# Patient Record
Sex: FEMALE | Race: WHITE | NOT HISPANIC OR LATINO | Employment: FULL TIME | URBAN - METROPOLITAN AREA
[De-identification: names, ages, dates, MRNs, and addresses within clinical notes are randomized per-mention and may not be internally consistent; named-entity substitution may affect disease eponyms.]

---

## 2017-02-14 ENCOUNTER — TELEPHONE (OUTPATIENT)
Dept: FAMILY MEDICINE | Age: 47
End: 2017-02-14

## 2017-02-14 RX ORDER — VALACYCLOVIR HYDROCHLORIDE 500 MG/1
500 TABLET, FILM COATED ORAL DAILY
Qty: 30 TABLET | Refills: 12 | Status: CANCELLED | OUTPATIENT
Start: 2017-02-14

## 2017-03-30 ENCOUNTER — WALK IN (OUTPATIENT)
Dept: URGENT CARE | Age: 47
End: 2017-03-30

## 2017-03-30 VITALS — HEART RATE: 67 BPM | TEMPERATURE: 98.5 F | OXYGEN SATURATION: 97 %

## 2017-03-30 DIAGNOSIS — J02.9 PHARYNGITIS, UNSPECIFIED ETIOLOGY: Primary | ICD-10-CM

## 2017-03-30 LAB — S PYO AG THROAT QL: NORMAL

## 2017-03-30 PROCEDURE — 99213 OFFICE O/P EST LOW 20 MIN: CPT | Performed by: NURSE PRACTITIONER

## 2017-03-30 PROCEDURE — 87880 STREP A ASSAY W/OPTIC: CPT | Performed by: NURSE PRACTITIONER

## 2017-04-04 ENCOUNTER — OFF PREMISE (OUTPATIENT)
Dept: OTHER | Age: 47
End: 2017-04-04

## 2017-08-09 ENCOUNTER — OFFICE VISIT (OUTPATIENT)
Dept: FAMILY MEDICINE | Age: 47
End: 2017-08-09

## 2017-08-09 VITALS
DIASTOLIC BLOOD PRESSURE: 64 MMHG | TEMPERATURE: 98 F | WEIGHT: 145 LBS | RESPIRATION RATE: 16 BRPM | SYSTOLIC BLOOD PRESSURE: 100 MMHG | HEART RATE: 72 BPM | BODY MASS INDEX: 24.16 KG/M2 | HEIGHT: 65 IN

## 2017-08-09 DIAGNOSIS — K21.9 GASTROESOPHAGEAL REFLUX DISEASE WITHOUT ESOPHAGITIS: ICD-10-CM

## 2017-08-09 DIAGNOSIS — F43.9 SITUATIONAL STRESS: ICD-10-CM

## 2017-08-09 DIAGNOSIS — A60.00 GENITAL HERPES SIMPLEX, UNSPECIFIED SITE: ICD-10-CM

## 2017-08-09 DIAGNOSIS — Z12.39 SCREENING FOR BREAST CANCER: ICD-10-CM

## 2017-08-09 DIAGNOSIS — Z00.00 WELL WOMAN EXAM WITHOUT GYNECOLOGICAL EXAM: Primary | ICD-10-CM

## 2017-08-09 PROCEDURE — 99396 PREV VISIT EST AGE 40-64: CPT | Performed by: NURSE PRACTITIONER

## 2017-08-09 ASSESSMENT — PATIENT HEALTH QUESTIONNAIRE - PHQ9
SUM OF ALL RESPONSES TO PHQ9 QUESTIONS 1 AND 2: 2
CLINICAL INTERPRETATION OF PHQ2 SCORE: 2

## 2017-08-17 ENCOUNTER — HOSPITAL ENCOUNTER (OUTPATIENT)
Dept: MAMMOGRAPHY | Age: 47
Discharge: HOME | End: 2017-08-17
Attending: NURSE PRACTITIONER

## 2017-08-17 DIAGNOSIS — Z12.39 SCREENING FOR BREAST CANCER: ICD-10-CM

## 2017-08-17 PROCEDURE — 77063 BREAST TOMOSYNTHESIS BI: CPT

## 2017-08-17 PROCEDURE — 77063 BREAST TOMOSYNTHESIS BI: CPT | Performed by: RADIOLOGY

## 2017-08-17 PROCEDURE — G0202 SCR MAMMO BI INCL CAD: HCPCS | Performed by: RADIOLOGY

## 2017-09-05 RX ORDER — VALACYCLOVIR HYDROCHLORIDE 500 MG/1
500 TABLET, FILM COATED ORAL DAILY
Qty: 30 TABLET | Refills: 11 | Status: SHIPPED | OUTPATIENT
Start: 2017-09-05 | End: 2018-09-01 | Stop reason: SDUPTHER

## 2017-10-30 ENCOUNTER — BEHAVIORAL HEALTH (OUTPATIENT)
Dept: BEHAVIORAL HEALTH | Age: 47
End: 2017-10-30

## 2017-10-30 DIAGNOSIS — F43.29 ADJUSTMENT DISORDER WITH MIXED EMOTIONAL FEATURES: Primary | ICD-10-CM

## 2017-10-30 PROCEDURE — 90791 PSYCH DIAGNOSTIC EVALUATION: CPT | Performed by: SOCIAL WORKER

## 2017-10-31 ASSESSMENT — PATIENT HEALTH QUESTIONNAIRE - PHQ9
SUM OF ALL RESPONSES TO PHQ QUESTIONS 1-9: 8
SUM OF ALL RESPONSES TO PHQ9 QUESTIONS 1 AND 2: 2
CLINICAL INTERPRETATION OF PHQ2 SCORE: 2
SUM OF ALL RESPONSES TO PHQ9 QUESTIONS 1 TO 9: 8
5. POOR APPETITE OR OVEREATING: MORE THAN HALF THE DAYS

## 2017-10-31 ASSESSMENT — ANXIETY QUESTIONNAIRES
3. WORRYING TOO MUCH ABOUT DIFFERENT THINGS: 1
2. NOT BEING ABLE TO STOP OR CONTROL WORRYING: 1
4. TROUBLE RELAXING: SEVERAL DAYS
7. FEELING AFRAID AS IF SOMETHING AWFUL MIGHT HAPPEN: SEVERAL DAYS
2. NOT BEING ABLE TO STOP OR CONTROL WORRYING: SEVERAL DAYS
7. FEELING AFRAID AS IF SOMETHING AWFUL MIGHT HAPPEN: 1
3. WORRYING TOO MUCH ABOUT DIFFERENT THINGS: SEVERAL DAYS
6. BECOMING EASILY ANNOYED OR IRRITABLE: SEVERAL DAYS
5. BEING SO RESTLESS THAT IT IS HARD TO SIT STILL: SEVERAL DAYS
4. TROUBLE RELAXING: 1
GAD7 TOTAL SCORE: 7
1. FEELING NERVOUS, ANXIOUS, OR ON EDGE: SEVERAL DAYS
5. BEING SO RESTLESS THAT IT IS HARD TO SIT STILL: 1
6. BECOMING EASILY ANNOYED OR IRRITABLE: 1
1. FEELING NERVOUS, ANXIOUS, OR ON EDGE: 1
IF YOU CHECKED OFF ANY PROBLEMS ON THIS QUESTIONNAIRE, HOW DIFFICULT HAVE THESE PROBLEMS MADE IT FOR YOU TO DO YOUR WORK, TAKE CARE OF THINGS AT HOME, OR GET ALONG WITH OTHER PEOPLE: VERY DIFFICULT

## 2017-11-13 ENCOUNTER — BEHAVIORAL HEALTH (OUTPATIENT)
Dept: BEHAVIORAL HEALTH | Age: 47
End: 2017-11-13

## 2017-11-13 DIAGNOSIS — F43.29 ADJUSTMENT DISORDER WITH MIXED EMOTIONAL FEATURES: Primary | ICD-10-CM

## 2017-11-13 PROCEDURE — 90834 PSYTX W PT 45 MINUTES: CPT | Performed by: SOCIAL WORKER

## 2018-01-01 ENCOUNTER — EXTERNAL RECORD (OUTPATIENT)
Dept: OTHER | Age: 48
End: 2018-01-01

## 2018-01-10 ENCOUNTER — BEHAVIORAL HEALTH (OUTPATIENT)
Dept: BEHAVIORAL HEALTH | Age: 48
End: 2018-01-10

## 2018-01-10 DIAGNOSIS — F43.29 ADJUSTMENT DISORDER WITH MIXED EMOTIONAL FEATURES: Primary | ICD-10-CM

## 2018-01-10 PROCEDURE — 90834 PSYTX W PT 45 MINUTES: CPT | Performed by: SOCIAL WORKER

## 2018-01-22 ENCOUNTER — BEHAVIORAL HEALTH (OUTPATIENT)
Dept: BEHAVIORAL HEALTH | Age: 48
End: 2018-01-22

## 2018-01-22 DIAGNOSIS — F43.29 ADJUSTMENT DISORDER WITH MIXED EMOTIONAL FEATURES: Primary | ICD-10-CM

## 2018-01-22 PROCEDURE — 90837 PSYTX W PT 60 MINUTES: CPT | Performed by: SOCIAL WORKER

## 2018-02-05 ENCOUNTER — BEHAVIORAL HEALTH (OUTPATIENT)
Dept: BEHAVIORAL HEALTH | Age: 48
End: 2018-02-05

## 2018-02-05 DIAGNOSIS — F43.29 ADJUSTMENT DISORDER WITH MIXED EMOTIONAL FEATURES: Primary | ICD-10-CM

## 2018-02-05 PROCEDURE — 90834 PSYTX W PT 45 MINUTES: CPT | Performed by: SOCIAL WORKER

## 2018-02-23 ENCOUNTER — WALK IN (OUTPATIENT)
Dept: URGENT CARE | Age: 48
End: 2018-02-23

## 2018-02-23 VITALS
OXYGEN SATURATION: 99 % | WEIGHT: 141.09 LBS | TEMPERATURE: 97.8 F | SYSTOLIC BLOOD PRESSURE: 110 MMHG | DIASTOLIC BLOOD PRESSURE: 70 MMHG | BODY MASS INDEX: 23.51 KG/M2 | HEIGHT: 65 IN | RESPIRATION RATE: 14 BRPM | HEART RATE: 69 BPM

## 2018-02-23 DIAGNOSIS — J06.9 VIRAL URI: ICD-10-CM

## 2018-02-23 DIAGNOSIS — R10.9 ABDOMINAL DISCOMFORT: Primary | ICD-10-CM

## 2018-02-23 PROCEDURE — 99214 OFFICE O/P EST MOD 30 MIN: CPT | Performed by: PHYSICIAN ASSISTANT

## 2018-02-26 ENCOUNTER — BEHAVIORAL HEALTH (OUTPATIENT)
Dept: BEHAVIORAL HEALTH | Age: 48
End: 2018-02-26

## 2018-02-26 DIAGNOSIS — F43.29 ADJUSTMENT DISORDER WITH MIXED EMOTIONAL FEATURES: Primary | ICD-10-CM

## 2018-02-26 PROCEDURE — 90834 PSYTX W PT 45 MINUTES: CPT | Performed by: SOCIAL WORKER

## 2018-05-17 ENCOUNTER — TELEPHONE (OUTPATIENT)
Dept: FAMILY MEDICINE | Age: 48
End: 2018-05-17

## 2018-05-17 RX ORDER — OMEPRAZOLE 20 MG/1
20 CAPSULE, DELAYED RELEASE ORAL DAILY
Qty: 90 CAPSULE | Refills: 0 | Status: SHIPPED | OUTPATIENT
Start: 2018-05-17 | End: 2018-05-21 | Stop reason: SDUPTHER

## 2018-05-21 RX ORDER — OMEPRAZOLE 20 MG/1
20 CAPSULE, DELAYED RELEASE ORAL DAILY
Qty: 90 CAPSULE | Refills: 1 | Status: SHIPPED | OUTPATIENT
Start: 2018-05-21 | End: 2019-01-28 | Stop reason: SDUPTHER

## 2018-07-25 ENCOUNTER — APPOINTMENT (OUTPATIENT)
Dept: MAMMOGRAPHY | Age: 48
End: 2018-07-25
Attending: NURSE PRACTITIONER

## 2018-08-20 ENCOUNTER — HOSPITAL ENCOUNTER (OUTPATIENT)
Dept: MAMMOGRAPHY | Age: 48
Discharge: HOME | End: 2018-08-20
Attending: NURSE PRACTITIONER

## 2018-08-20 DIAGNOSIS — Z12.39 SCREENING BREAST EXAMINATION: ICD-10-CM

## 2018-08-20 PROCEDURE — 77063 BREAST TOMOSYNTHESIS BI: CPT

## 2018-08-20 PROCEDURE — 77067 SCR MAMMO BI INCL CAD: CPT | Performed by: RADIOLOGY

## 2018-08-20 PROCEDURE — 77063 BREAST TOMOSYNTHESIS BI: CPT | Performed by: RADIOLOGY

## 2018-08-26 ENCOUNTER — WALK IN (OUTPATIENT)
Dept: URGENT CARE | Age: 48
End: 2018-08-26

## 2018-08-26 DIAGNOSIS — R11.2 NAUSEA AND VOMITING, INTRACTABILITY OF VOMITING NOT SPECIFIED, UNSPECIFIED VOMITING TYPE: Primary | ICD-10-CM

## 2018-08-26 PROCEDURE — 99214 OFFICE O/P EST MOD 30 MIN: CPT | Performed by: PHYSICIAN ASSISTANT

## 2018-08-26 RX ORDER — ONDANSETRON 4 MG/1
4 TABLET, ORALLY DISINTEGRATING ORAL EVERY 8 HOURS PRN
Qty: 6 TABLET | Refills: 0 | Status: SHIPPED | OUTPATIENT
Start: 2018-08-26 | End: 2018-12-26 | Stop reason: ALTCHOICE

## 2018-08-26 ASSESSMENT — PAIN SCALES - GENERAL: PAINLEVEL: 5-6

## 2018-09-04 RX ORDER — VALACYCLOVIR HYDROCHLORIDE 500 MG/1
500 TABLET, FILM COATED ORAL DAILY
Qty: 30 TABLET | Refills: 3 | Status: SHIPPED | OUTPATIENT
Start: 2018-09-04 | End: 2019-01-11 | Stop reason: SDUPTHER

## 2018-12-26 ENCOUNTER — OFFICE VISIT (OUTPATIENT)
Dept: FAMILY MEDICINE | Age: 48
End: 2018-12-26

## 2018-12-26 ENCOUNTER — LAB SERVICES (OUTPATIENT)
Dept: LAB | Age: 48
End: 2018-12-26

## 2018-12-26 VITALS
WEIGHT: 144 LBS | HEART RATE: 73 BPM | DIASTOLIC BLOOD PRESSURE: 77 MMHG | TEMPERATURE: 98.1 F | BODY MASS INDEX: 23.99 KG/M2 | SYSTOLIC BLOOD PRESSURE: 122 MMHG | RESPIRATION RATE: 16 BRPM | HEIGHT: 65 IN

## 2018-12-26 DIAGNOSIS — Z13.29 SCREENING FOR ENDOCRINE, NUTRITIONAL, METABOLIC AND IMMUNITY DISORDER: ICD-10-CM

## 2018-12-26 DIAGNOSIS — K44.9 HIATAL HERNIA: ICD-10-CM

## 2018-12-26 DIAGNOSIS — N92.1 MENOMETRORRHAGIA: ICD-10-CM

## 2018-12-26 DIAGNOSIS — Z13.228 SCREENING FOR ENDOCRINE, NUTRITIONAL, METABOLIC AND IMMUNITY DISORDER: ICD-10-CM

## 2018-12-26 DIAGNOSIS — Z01.419 WELL WOMAN EXAM WITH ROUTINE GYNECOLOGICAL EXAM: Primary | ICD-10-CM

## 2018-12-26 DIAGNOSIS — K21.9 GASTROESOPHAGEAL REFLUX DISEASE WITHOUT ESOPHAGITIS: ICD-10-CM

## 2018-12-26 DIAGNOSIS — Z13.21 SCREENING FOR ENDOCRINE, NUTRITIONAL, METABOLIC AND IMMUNITY DISORDER: ICD-10-CM

## 2018-12-26 DIAGNOSIS — Z13.0 SCREENING FOR ENDOCRINE, NUTRITIONAL, METABOLIC AND IMMUNITY DISORDER: ICD-10-CM

## 2018-12-26 DIAGNOSIS — Z23 NEED FOR VACCINATION: ICD-10-CM

## 2018-12-26 PROCEDURE — 90471 IMMUNIZATION ADMIN: CPT | Performed by: NURSE PRACTITIONER

## 2018-12-26 PROCEDURE — 99396 PREV VISIT EST AGE 40-64: CPT | Performed by: NURSE PRACTITIONER

## 2018-12-26 PROCEDURE — 36415 COLL VENOUS BLD VENIPUNCTURE: CPT | Performed by: FAMILY MEDICINE

## 2018-12-26 PROCEDURE — 90714 TD VACC NO PRESV 7 YRS+ IM: CPT | Performed by: NURSE PRACTITIONER

## 2018-12-26 PROCEDURE — 85025 COMPLETE CBC W/AUTO DIFF WBC: CPT | Performed by: INTERNAL MEDICINE

## 2018-12-26 PROCEDURE — 80048 BASIC METABOLIC PNL TOTAL CA: CPT | Performed by: INTERNAL MEDICINE

## 2018-12-26 ASSESSMENT — PATIENT HEALTH QUESTIONNAIRE - PHQ9
CLINICAL INTERPRETATION OF PHQ2 SCORE: 0
SUM OF ALL RESPONSES TO PHQ9 QUESTIONS 1 AND 2: 0

## 2018-12-27 ENCOUNTER — TELEPHONE (OUTPATIENT)
Dept: FAMILY MEDICINE | Age: 48
End: 2018-12-27

## 2018-12-27 LAB
ANION GAP SERPL CALC-SCNC: 9 MMOL/L (ref 10–20)
BASOPHILS # BLD AUTO: 0.1 K/MCL (ref 0–0.3)
BASOPHILS NFR BLD AUTO: 1 %
BUN SERPL-MCNC: 15 MG/DL (ref 6–20)
BUN/CREAT SERPL: 22 (ref 7–25)
CALCIUM SERPL-MCNC: 8.9 MG/DL (ref 8.4–10.2)
CHLORIDE SERPL-SCNC: 105 MMOL/L (ref 98–107)
CO2 SERPL-SCNC: 29 MMOL/L (ref 21–32)
CREAT SERPL-MCNC: 0.68 MG/DL (ref 0.51–0.95)
DIFFERENTIAL METHOD BLD: NORMAL
EOSINOPHIL # BLD AUTO: 0.1 K/MCL (ref 0.1–0.5)
EOSINOPHIL NFR SPEC: 2 %
ERYTHROCYTE [DISTWIDTH] IN BLOOD: 12.9 % (ref 11–15)
FASTING STATUS PATIENT QL REPORTED: ABNORMAL HRS
GLUCOSE SERPL-MCNC: 84 MG/DL (ref 65–99)
HCT VFR BLD CALC: 39.6 % (ref 36–46.5)
HGB BLD-MCNC: 12.7 G/DL (ref 12–15.5)
IMM GRANULOCYTES # BLD AUTO: 0 K/MCL (ref 0–0.2)
IMM GRANULOCYTES NFR BLD: 0 %
LYMPHOCYTES # BLD MANUAL: 1.8 K/MCL (ref 1–4.8)
LYMPHOCYTES NFR BLD MANUAL: 35 %
MCH RBC QN AUTO: 29.5 PG (ref 26–34)
MCHC RBC AUTO-ENTMCNC: 32.1 G/DL (ref 32–36.5)
MCV RBC AUTO: 92.1 FL (ref 78–100)
MONOCYTES # BLD MANUAL: 0.5 K/MCL (ref 0.3–0.9)
MONOCYTES NFR BLD MANUAL: 10 %
NEUTROPHILS # BLD: 2.7 K/MCL (ref 1.8–7.7)
NEUTROPHILS NFR BLD AUTO: 52 %
NRBC BLD MANUAL-RTO: 0 /100 WBC
PLATELET # BLD: 267 K/MCL (ref 140–450)
POTASSIUM SERPL-SCNC: 4.1 MMOL/L (ref 3.4–5.1)
RBC # BLD: 4.3 MIL/MCL (ref 4–5.2)
SODIUM SERPL-SCNC: 139 MMOL/L (ref 135–145)
WBC # BLD: 5.1 K/MCL (ref 4.2–11)

## 2019-01-03 ENCOUNTER — HOSPITAL ENCOUNTER (OUTPATIENT)
Dept: ULTRASOUND IMAGING | Age: 49
Discharge: HOME | End: 2019-01-03
Attending: NURSE PRACTITIONER

## 2019-01-03 DIAGNOSIS — N92.1 MENOMETRORRHAGIA: ICD-10-CM

## 2019-01-03 PROCEDURE — 76856 US EXAM PELVIC COMPLETE: CPT | Performed by: RADIOLOGY

## 2019-01-03 PROCEDURE — 93975 VASCULAR STUDY: CPT

## 2019-01-03 PROCEDURE — 76830 TRANSVAGINAL US NON-OB: CPT | Performed by: RADIOLOGY

## 2019-01-03 PROCEDURE — 76856 US EXAM PELVIC COMPLETE: CPT

## 2019-01-03 PROCEDURE — 93975 VASCULAR STUDY: CPT | Performed by: RADIOLOGY

## 2019-01-08 ENCOUNTER — TELEPHONE (OUTPATIENT)
Dept: FAMILY MEDICINE | Age: 49
End: 2019-01-08

## 2019-01-08 DIAGNOSIS — N92.6 ABNORMAL BLEEDING IN MENSTRUAL CYCLE: Primary | ICD-10-CM

## 2019-01-08 DIAGNOSIS — D21.9 FIBROIDS: ICD-10-CM

## 2019-01-08 DIAGNOSIS — R93.89 ABNORMAL PELVIC ULTRASOUND: ICD-10-CM

## 2019-01-11 RX ORDER — VALACYCLOVIR HYDROCHLORIDE 500 MG/1
500 TABLET, FILM COATED ORAL DAILY
Qty: 30 TABLET | Refills: 5 | Status: SHIPPED | OUTPATIENT
Start: 2019-01-11 | End: 2019-07-06 | Stop reason: SDUPTHER

## 2019-01-14 ENCOUNTER — E-ADVICE (OUTPATIENT)
Dept: FAMILY MEDICINE | Age: 49
End: 2019-01-14

## 2019-01-16 ENCOUNTER — E-ADVICE (OUTPATIENT)
Dept: FAMILY MEDICINE | Age: 49
End: 2019-01-16

## 2019-01-17 ENCOUNTER — E-ADVICE (OUTPATIENT)
Dept: FAMILY MEDICINE | Age: 49
End: 2019-01-17

## 2019-01-23 ENCOUNTER — E-ADVICE (OUTPATIENT)
Dept: FAMILY MEDICINE | Age: 49
End: 2019-01-23

## 2019-01-29 RX ORDER — OMEPRAZOLE 20 MG/1
20 CAPSULE, DELAYED RELEASE ORAL DAILY
Qty: 90 CAPSULE | Refills: 0 | Status: SHIPPED | OUTPATIENT
Start: 2019-01-29 | End: 2019-04-27 | Stop reason: SDUPTHER

## 2019-02-06 ENCOUNTER — E-ADVICE (OUTPATIENT)
Dept: OBGYN | Age: 49
End: 2019-02-06

## 2019-02-15 ENCOUNTER — OFFICE VISIT (OUTPATIENT)
Dept: OBGYN | Age: 49
End: 2019-02-15

## 2019-02-15 VITALS
HEIGHT: 65 IN | DIASTOLIC BLOOD PRESSURE: 74 MMHG | BODY MASS INDEX: 24.04 KG/M2 | SYSTOLIC BLOOD PRESSURE: 120 MMHG | WEIGHT: 144.3 LBS

## 2019-02-15 DIAGNOSIS — N92.6 IRREGULAR UTERINE BLEEDING: Primary | ICD-10-CM

## 2019-02-15 DIAGNOSIS — Z01.812 PRE-PROCEDURE LAB EXAM: ICD-10-CM

## 2019-02-15 DIAGNOSIS — Z80.41 FAMILY HISTORY OF MALIGNANT NEOPLASM OF OVARY: ICD-10-CM

## 2019-02-15 LAB
B-HCG UR QL: NEGATIVE
SP GR UR: NORMAL

## 2019-02-15 PROCEDURE — 99203 OFFICE O/P NEW LOW 30 MIN: CPT | Performed by: OBSTETRICS & GYNECOLOGY

## 2019-02-15 PROCEDURE — 81025 URINE PREGNANCY TEST: CPT | Performed by: OBSTETRICS & GYNECOLOGY

## 2019-02-15 PROCEDURE — 58100 BIOPSY OF UTERUS LINING: CPT | Performed by: OBSTETRICS & GYNECOLOGY

## 2019-02-18 LAB — PATHOLOGIST NAME: NORMAL

## 2019-02-20 ENCOUNTER — TELEPHONE (OUTPATIENT)
Dept: OBGYN | Age: 49
End: 2019-02-20

## 2019-04-05 ENCOUNTER — E-ADVICE (OUTPATIENT)
Dept: FAMILY MEDICINE | Age: 49
End: 2019-04-05

## 2019-04-10 RX ORDER — VALACYCLOVIR HYDROCHLORIDE 500 MG/1
500 TABLET, FILM COATED ORAL DAILY
Qty: 30 TABLET | Refills: 5 | OUTPATIENT
Start: 2019-04-10

## 2019-04-24 ENCOUNTER — WALK IN (OUTPATIENT)
Dept: URGENT CARE | Age: 49
End: 2019-04-24

## 2019-04-24 DIAGNOSIS — L25.9 CONTACT DERMATITIS, UNSPECIFIED CONTACT DERMATITIS TYPE, UNSPECIFIED TRIGGER: Primary | ICD-10-CM

## 2019-04-24 PROCEDURE — 99213 OFFICE O/P EST LOW 20 MIN: CPT | Performed by: NURSE PRACTITIONER

## 2019-04-24 RX ORDER — TRIAMCINOLONE ACETONIDE 1 MG/G
CREAM TOPICAL 2 TIMES DAILY
Qty: 15 G | Refills: 0 | Status: SHIPPED | OUTPATIENT
Start: 2019-04-24 | End: 2019-04-30 | Stop reason: ALTCHOICE

## 2019-04-24 ASSESSMENT — ENCOUNTER SYMPTOMS: FEVER: 0

## 2019-04-29 RX ORDER — OMEPRAZOLE 20 MG/1
20 CAPSULE, DELAYED RELEASE ORAL DAILY
Qty: 90 CAPSULE | Refills: 3 | Status: SHIPPED | OUTPATIENT
Start: 2019-04-29 | End: 2019-12-30 | Stop reason: SDUPTHER

## 2019-04-30 ENCOUNTER — WALK IN (OUTPATIENT)
Dept: URGENT CARE | Age: 49
End: 2019-04-30

## 2019-04-30 VITALS — TEMPERATURE: 98.8 F

## 2019-04-30 DIAGNOSIS — L28.2 PRURITIC RASH: Primary | ICD-10-CM

## 2019-04-30 PROCEDURE — 99213 OFFICE O/P EST LOW 20 MIN: CPT | Performed by: NURSE PRACTITIONER

## 2019-04-30 RX ORDER — TRIAMCINOLONE ACETONIDE 1 MG/G
CREAM TOPICAL 2 TIMES DAILY
Qty: 15 G | Refills: 0 | Status: SHIPPED | OUTPATIENT
Start: 2019-04-30 | End: 2020-02-12 | Stop reason: ALTCHOICE

## 2019-05-17 ENCOUNTER — APPOINTMENT (OUTPATIENT)
Dept: ULTRASOUND IMAGING | Age: 49
End: 2019-05-17
Attending: OBSTETRICS & GYNECOLOGY

## 2019-05-24 ENCOUNTER — OFFICE VISIT (OUTPATIENT)
Dept: OBGYN | Age: 49
End: 2019-05-24

## 2019-05-24 ENCOUNTER — IMAGING SERVICES (OUTPATIENT)
Dept: ULTRASOUND IMAGING | Age: 49
End: 2019-05-24
Attending: OBSTETRICS & GYNECOLOGY

## 2019-05-24 VITALS
BODY MASS INDEX: 24.24 KG/M2 | WEIGHT: 145.5 LBS | DIASTOLIC BLOOD PRESSURE: 70 MMHG | HEIGHT: 65 IN | SYSTOLIC BLOOD PRESSURE: 112 MMHG

## 2019-05-24 DIAGNOSIS — Z80.41 FAMILY HISTORY OF MALIGNANT NEOPLASM OF OVARY: ICD-10-CM

## 2019-05-24 DIAGNOSIS — N92.6 IRREGULAR UTERINE BLEEDING: Primary | ICD-10-CM

## 2019-05-24 DIAGNOSIS — N92.6 IRREGULAR UTERINE BLEEDING: ICD-10-CM

## 2019-05-24 PROCEDURE — 76830 TRANSVAGINAL US NON-OB: CPT | Performed by: OBSTETRICS & GYNECOLOGY

## 2019-05-24 PROCEDURE — 99213 OFFICE O/P EST LOW 20 MIN: CPT | Performed by: OBSTETRICS & GYNECOLOGY

## 2019-05-29 ENCOUNTER — TELEPHONE (OUTPATIENT)
Dept: OBGYN | Age: 49
End: 2019-05-29

## 2019-05-29 DIAGNOSIS — N92.6 IRREGULAR UTERINE BLEEDING: Primary | ICD-10-CM

## 2019-05-29 DIAGNOSIS — N83.8 CALCIFICATION OF OVARY: ICD-10-CM

## 2019-05-29 DIAGNOSIS — Z80.41 FAMILY HISTORY OF MALIGNANT NEOPLASM OF OVARY: ICD-10-CM

## 2019-07-08 RX ORDER — VALACYCLOVIR HYDROCHLORIDE 500 MG/1
500 TABLET, FILM COATED ORAL DAILY
Qty: 30 TABLET | Refills: 5 | Status: SHIPPED | OUTPATIENT
Start: 2019-07-08 | End: 2019-12-30 | Stop reason: SDUPTHER

## 2019-08-22 ENCOUNTER — E-ADVICE (OUTPATIENT)
Dept: FAMILY MEDICINE | Age: 49
End: 2019-08-22

## 2019-08-23 ENCOUNTER — HOSPITAL ENCOUNTER (OUTPATIENT)
Dept: MAMMOGRAPHY | Age: 49
Discharge: HOME | End: 2019-08-23
Attending: NURSE PRACTITIONER

## 2019-08-23 DIAGNOSIS — Z12.31 VISIT FOR SCREENING MAMMOGRAM: ICD-10-CM

## 2019-08-23 PROCEDURE — 77063 BREAST TOMOSYNTHESIS BI: CPT

## 2019-08-23 PROCEDURE — 77063 BREAST TOMOSYNTHESIS BI: CPT | Performed by: RADIOLOGY

## 2019-08-23 PROCEDURE — 77067 SCR MAMMO BI INCL CAD: CPT | Performed by: RADIOLOGY

## 2019-08-23 PROCEDURE — 77067 SCR MAMMO BI INCL CAD: CPT

## 2019-10-01 ENCOUNTER — WALK IN (OUTPATIENT)
Dept: URGENT CARE | Age: 49
End: 2019-10-01

## 2019-10-01 VITALS — TEMPERATURE: 98.1 F | HEART RATE: 68 BPM | RESPIRATION RATE: 18 BRPM

## 2019-10-01 DIAGNOSIS — L03.211 CELLULITIS OF CHEEK: Primary | ICD-10-CM

## 2019-10-01 PROCEDURE — 99213 OFFICE O/P EST LOW 20 MIN: CPT | Performed by: NURSE PRACTITIONER

## 2019-10-01 RX ORDER — DOXYCYCLINE HYCLATE 100 MG
100 TABLET ORAL 2 TIMES DAILY
Qty: 14 TABLET | Refills: 0 | Status: SHIPPED | OUTPATIENT
Start: 2019-10-01 | End: 2019-10-08

## 2019-11-12 ENCOUNTER — E-ADVICE (OUTPATIENT)
Dept: FAMILY MEDICINE | Age: 49
End: 2019-11-12

## 2019-12-30 ENCOUNTER — OFFICE VISIT (OUTPATIENT)
Dept: FAMILY MEDICINE | Age: 49
End: 2019-12-30

## 2019-12-30 ENCOUNTER — TELEPHONE (OUTPATIENT)
Dept: SURGERY | Age: 49
End: 2019-12-30

## 2019-12-30 VITALS
TEMPERATURE: 99.3 F | SYSTOLIC BLOOD PRESSURE: 118 MMHG | HEIGHT: 65 IN | RESPIRATION RATE: 16 BRPM | HEART RATE: 67 BPM | DIASTOLIC BLOOD PRESSURE: 62 MMHG | BODY MASS INDEX: 23.66 KG/M2 | WEIGHT: 142 LBS

## 2019-12-30 DIAGNOSIS — N63.10 MASS OF RIGHT BREAST: ICD-10-CM

## 2019-12-30 DIAGNOSIS — K21.9 GASTROESOPHAGEAL REFLUX DISEASE WITHOUT ESOPHAGITIS: ICD-10-CM

## 2019-12-30 DIAGNOSIS — K44.9 HIATAL HERNIA: ICD-10-CM

## 2019-12-30 DIAGNOSIS — B00.1 RECURRENT HERPES LABIALIS: ICD-10-CM

## 2019-12-30 DIAGNOSIS — Z01.419 WELL WOMAN EXAM WITH ROUTINE GYNECOLOGICAL EXAM: Primary | ICD-10-CM

## 2019-12-30 DIAGNOSIS — K21.9 GASTROESOPHAGEAL REFLUX DISEASE WITHOUT ESOPHAGITIS: Primary | ICD-10-CM

## 2019-12-30 PROCEDURE — 99396 PREV VISIT EST AGE 40-64: CPT | Performed by: NURSE PRACTITIONER

## 2019-12-30 RX ORDER — VALACYCLOVIR HYDROCHLORIDE 500 MG/1
500 TABLET, FILM COATED ORAL DAILY
Qty: 100 TABLET | Refills: 3 | Status: SHIPPED | OUTPATIENT
Start: 2019-12-30 | End: 2019-12-30 | Stop reason: SDUPTHER

## 2019-12-30 RX ORDER — OMEPRAZOLE 20 MG/1
20 CAPSULE, DELAYED RELEASE ORAL DAILY
Qty: 90 CAPSULE | Refills: 3 | Status: SHIPPED | OUTPATIENT
Start: 2019-12-30 | End: 2020-06-03

## 2019-12-30 RX ORDER — VALACYCLOVIR HYDROCHLORIDE 500 MG/1
500 TABLET, FILM COATED ORAL DAILY
Qty: 100 TABLET | Refills: 3 | Status: SHIPPED | OUTPATIENT
Start: 2019-12-30 | End: 2021-01-28

## 2019-12-30 RX ORDER — OMEPRAZOLE 20 MG/1
20 CAPSULE, DELAYED RELEASE ORAL DAILY
Qty: 90 CAPSULE | Refills: 3 | Status: SHIPPED | OUTPATIENT
Start: 2019-12-30 | End: 2019-12-30 | Stop reason: SDUPTHER

## 2019-12-30 ASSESSMENT — PATIENT HEALTH QUESTIONNAIRE - PHQ9
1. LITTLE INTEREST OR PLEASURE IN DOING THINGS: NOT AT ALL
SUM OF ALL RESPONSES TO PHQ9 QUESTIONS 1 AND 2: 0
2. FEELING DOWN, DEPRESSED OR HOPELESS: NOT AT ALL
SUM OF ALL RESPONSES TO PHQ9 QUESTIONS 1 AND 2: 0

## 2019-12-31 ENCOUNTER — ANESTHESIA (OUTPATIENT)
Dept: SURGERY | Age: 49
End: 2019-12-31

## 2019-12-31 ENCOUNTER — PRIOR ORIGINAL RECORDS (OUTPATIENT)
Dept: ADMINISTRATIVE | Age: 49
End: 2019-12-31

## 2019-12-31 ENCOUNTER — ANESTHESIA EVENT (OUTPATIENT)
Dept: SURGERY | Age: 49
End: 2019-12-31

## 2019-12-31 ENCOUNTER — HOSPITAL ENCOUNTER (OUTPATIENT)
Age: 49
Discharge: HOME | End: 2019-12-31
Attending: SURGERY | Admitting: SURGERY

## 2019-12-31 VITALS
DIASTOLIC BLOOD PRESSURE: 60 MMHG | SYSTOLIC BLOOD PRESSURE: 132 MMHG | TEMPERATURE: 97.8 F | RESPIRATION RATE: 16 BRPM | HEIGHT: 65 IN | BODY MASS INDEX: 23.66 KG/M2 | OXYGEN SATURATION: 100 % | WEIGHT: 142 LBS | HEART RATE: 54 BPM

## 2019-12-31 LAB — B-HCG UR QL: NEGATIVE

## 2019-12-31 PROCEDURE — 10004185 HB COUNTER-VISIT  CENSUS

## 2019-12-31 PROCEDURE — 10004348 HB COUNTER-EVAL PRE-OP

## 2019-12-31 PROCEDURE — 10002362 HB ANESTH MAC IV 1ST 1/2 HR: Performed by: SURGERY

## 2019-12-31 PROCEDURE — 10002807 HB RX 258: Performed by: ANESTHESIOLOGY

## 2019-12-31 PROCEDURE — 10002801 HB RX 250 W/O HCPCS: Performed by: NURSE ANESTHETIST, CERTIFIED REGISTERED

## 2019-12-31 PROCEDURE — 10003436 HB UPPER GI ENDOSCOPY: Performed by: SURGERY

## 2019-12-31 PROCEDURE — 10004316 HB COUNTER-PREP

## 2019-12-31 PROCEDURE — 88305 TISSUE EXAM BY PATHOLOGIST: CPT

## 2019-12-31 PROCEDURE — 43239 EGD BIOPSY SINGLE/MULTIPLE: CPT | Performed by: NURSE ANESTHETIST, CERTIFIED REGISTERED

## 2019-12-31 PROCEDURE — 43239 EGD BIOPSY SINGLE/MULTIPLE: CPT | Performed by: SURGERY

## 2019-12-31 PROCEDURE — 10002800 HB RX 250 W HCPCS: Performed by: NURSE ANESTHETIST, CERTIFIED REGISTERED

## 2019-12-31 PROCEDURE — 81025 URINE PREGNANCY TEST: CPT | Performed by: ANESTHESIOLOGY

## 2019-12-31 RX ORDER — DEXTROSE MONOHYDRATE 50 MG/ML
INJECTION, SOLUTION INTRAVENOUS CONTINUOUS PRN
Status: DISCONTINUED | OUTPATIENT
Start: 2019-12-31 | End: 2019-12-31 | Stop reason: HOSPADM

## 2019-12-31 RX ORDER — LIDOCAINE HYDROCHLORIDE 10 MG/ML
INJECTION, SOLUTION INFILTRATION; PERINEURAL PRN
Status: DISCONTINUED | OUTPATIENT
Start: 2019-12-31 | End: 2019-12-31

## 2019-12-31 RX ORDER — SODIUM CHLORIDE 9 MG/ML
INJECTION, SOLUTION INTRAVENOUS
Status: DISCONTINUED | OUTPATIENT
Start: 2019-12-31 | End: 2019-12-31 | Stop reason: HOSPADM

## 2019-12-31 RX ORDER — DEXTROSE MONOHYDRATE 25 G/50ML
25 INJECTION, SOLUTION INTRAVENOUS PRN
Status: DISCONTINUED | OUTPATIENT
Start: 2019-12-31 | End: 2019-12-31 | Stop reason: HOSPADM

## 2019-12-31 RX ORDER — ONDANSETRON 2 MG/ML
4 INJECTION INTRAMUSCULAR; INTRAVENOUS 2 TIMES DAILY PRN
Status: DISCONTINUED | OUTPATIENT
Start: 2019-12-31 | End: 2019-12-31 | Stop reason: HOSPADM

## 2019-12-31 RX ORDER — LIDOCAINE AND PRILOCAINE 25; 25 MG/G; MG/G
1 CREAM TOPICAL PRN
Status: DISCONTINUED | OUTPATIENT
Start: 2019-12-31 | End: 2019-12-31 | Stop reason: HOSPADM

## 2019-12-31 RX ORDER — SODIUM CHLORIDE, SODIUM LACTATE, POTASSIUM CHLORIDE, CALCIUM CHLORIDE 600; 310; 30; 20 MG/100ML; MG/100ML; MG/100ML; MG/100ML
INJECTION, SOLUTION INTRAVENOUS CONTINUOUS
Status: DISCONTINUED | OUTPATIENT
Start: 2019-12-31 | End: 2019-12-31 | Stop reason: HOSPADM

## 2019-12-31 RX ORDER — PROPOFOL 10 MG/ML
INJECTION, EMULSION INTRAVENOUS PRN
Status: DISCONTINUED | OUTPATIENT
Start: 2019-12-31 | End: 2019-12-31

## 2019-12-31 RX ORDER — 0.9 % SODIUM CHLORIDE 0.9 %
2 VIAL (ML) INJECTION EVERY 12 HOURS SCHEDULED
Status: DISCONTINUED | OUTPATIENT
Start: 2019-12-31 | End: 2019-12-31 | Stop reason: HOSPADM

## 2019-12-31 RX ORDER — LIDOCAINE HYDROCHLORIDE 10 MG/ML
5-10 INJECTION, SOLUTION INFILTRATION; PERINEURAL PRN
Status: DISCONTINUED | OUTPATIENT
Start: 2019-12-31 | End: 2019-12-31 | Stop reason: HOSPADM

## 2019-12-31 RX ORDER — NALOXONE HCL 0.4 MG/ML
0.2 VIAL (ML) INJECTION EVERY 5 MIN PRN
Status: DISCONTINUED | OUTPATIENT
Start: 2019-12-31 | End: 2019-12-31 | Stop reason: HOSPADM

## 2019-12-31 RX ORDER — HYDRALAZINE HYDROCHLORIDE 20 MG/ML
5 INJECTION INTRAMUSCULAR; INTRAVENOUS EVERY 10 MIN PRN
Status: DISCONTINUED | OUTPATIENT
Start: 2019-12-31 | End: 2019-12-31 | Stop reason: HOSPADM

## 2019-12-31 RX ORDER — HUMAN INSULIN 100 [IU]/ML
INJECTION, SOLUTION SUBCUTANEOUS
Status: DISCONTINUED | OUTPATIENT
Start: 2019-12-31 | End: 2019-12-31 | Stop reason: HOSPADM

## 2019-12-31 RX ORDER — MIDAZOLAM HYDROCHLORIDE 1 MG/ML
1-2 INJECTION, SOLUTION INTRAMUSCULAR; INTRAVENOUS
Status: DISCONTINUED | OUTPATIENT
Start: 2019-12-31 | End: 2019-12-31 | Stop reason: HOSPADM

## 2019-12-31 RX ADMIN — SODIUM CHLORIDE, POTASSIUM CHLORIDE, SODIUM LACTATE AND CALCIUM CHLORIDE: 600; 310; 30; 20 INJECTION, SOLUTION INTRAVENOUS at 08:48

## 2019-12-31 RX ADMIN — LIDOCAINE HYDROCHLORIDE 60 MG: 10 INJECTION, SOLUTION INFILTRATION; PERINEURAL at 09:03

## 2019-12-31 RX ADMIN — PROPOFOL 20 MG: 10 INJECTION, EMULSION INTRAVENOUS at 09:05

## 2019-12-31 RX ADMIN — PROPOFOL 20 MG: 10 INJECTION, EMULSION INTRAVENOUS at 09:09

## 2019-12-31 RX ADMIN — PROPOFOL 100 MG: 10 INJECTION, EMULSION INTRAVENOUS at 09:03

## 2019-12-31 RX ADMIN — SODIUM CHLORIDE, POTASSIUM CHLORIDE, SODIUM LACTATE AND CALCIUM CHLORIDE: 600; 310; 30; 20 INJECTION, SOLUTION INTRAVENOUS at 08:36

## 2019-12-31 ASSESSMENT — ENCOUNTER SYMPTOMS: EXERCISE TOLERANCE: GOOD (>4 METS)

## 2019-12-31 ASSESSMENT — PAIN SCALES - GENERAL
PAINLEVEL_OUTOF10: 0

## 2020-01-01 ENCOUNTER — EXTERNAL RECORD (OUTPATIENT)
Dept: OTHER | Age: 50
End: 2020-01-01

## 2020-01-02 LAB — PATHOLOGIST NAME: NORMAL

## 2020-01-03 ENCOUNTER — E-ADVICE (OUTPATIENT)
Dept: SURGERY | Age: 50
End: 2020-01-03

## 2020-01-07 RX ORDER — VALACYCLOVIR HYDROCHLORIDE 500 MG/1
500 TABLET, FILM COATED ORAL DAILY
Qty: 30 TABLET | Refills: 0 | OUTPATIENT
Start: 2020-01-07

## 2020-01-13 ENCOUNTER — OFFICE VISIT (OUTPATIENT)
Dept: FAMILY MEDICINE | Age: 50
End: 2020-01-13

## 2020-01-13 ENCOUNTER — LAB SERVICES (OUTPATIENT)
Dept: LAB | Age: 50
End: 2020-01-13

## 2020-01-13 VITALS
WEIGHT: 141 LBS | OXYGEN SATURATION: 98 % | RESPIRATION RATE: 16 BRPM | HEIGHT: 65 IN | HEART RATE: 67 BPM | DIASTOLIC BLOOD PRESSURE: 69 MMHG | SYSTOLIC BLOOD PRESSURE: 129 MMHG | BODY MASS INDEX: 23.49 KG/M2
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
## 2020-01-13 DIAGNOSIS — N63.10 MASS OF RIGHT BREAST: Primary | ICD-10-CM

## 2020-01-13 DIAGNOSIS — R58 ECCHYMOSIS: ICD-10-CM

## 2020-01-13 DIAGNOSIS — M25.521 RIGHT ELBOW PAIN: ICD-10-CM

## 2020-01-13 DIAGNOSIS — R06.02 SHORTNESS OF BREATH: ICD-10-CM

## 2020-01-13 DIAGNOSIS — M79.18 RIGHT BUTTOCK PAIN: ICD-10-CM

## 2020-01-13 DIAGNOSIS — M79.632 LEFT FOREARM PAIN: ICD-10-CM

## 2020-01-13 PROCEDURE — 85025 COMPLETE CBC W/AUTO DIFF WBC: CPT | Performed by: INTERNAL MEDICINE

## 2020-01-13 PROCEDURE — 36415 COLL VENOUS BLD VENIPUNCTURE: CPT | Performed by: FAMILY MEDICINE

## 2020-01-13 PROCEDURE — 99214 OFFICE O/P EST MOD 30 MIN: CPT | Performed by: NURSE PRACTITIONER

## 2020-01-14 ENCOUNTER — TELEPHONE (OUTPATIENT)
Dept: FAMILY MEDICINE | Age: 50
End: 2020-01-14

## 2020-01-14 ENCOUNTER — E-ADVICE (OUTPATIENT)
Dept: FAMILY MEDICINE | Age: 50
End: 2020-01-14

## 2020-01-14 LAB
BASOPHILS # BLD AUTO: 0.1 K/MCL (ref 0–0.3)
BASOPHILS NFR BLD AUTO: 1 %
DIFFERENTIAL METHOD BLD: NORMAL
EOSINOPHIL # BLD AUTO: 0.1 K/MCL (ref 0.1–0.5)
EOSINOPHIL NFR SPEC: 1 %
ERYTHROCYTE [DISTWIDTH] IN BLOOD: 12.4 % (ref 11–15)
HCT VFR BLD CALC: 37.9 % (ref 36–46.5)
HGB BLD-MCNC: 12.5 G/DL (ref 12–15.5)
IMM GRANULOCYTES # BLD AUTO: 0 K/MCL (ref 0–0.2)
IMM GRANULOCYTES NFR BLD: 1 %
LYMPHOCYTES # BLD MANUAL: 1.5 K/MCL (ref 1–4.8)
LYMPHOCYTES NFR BLD MANUAL: 25 %
MCH RBC QN AUTO: 29.8 PG (ref 26–34)
MCHC RBC AUTO-ENTMCNC: 33 G/DL (ref 32–36.5)
MCV RBC AUTO: 90.2 FL (ref 78–100)
MONOCYTES # BLD MANUAL: 0.5 K/MCL (ref 0.3–0.9)
MONOCYTES NFR BLD MANUAL: 8 %
NEUTROPHILS # BLD: 3.9 K/MCL (ref 1.8–7.7)
NEUTROPHILS NFR BLD AUTO: 64 %
NRBC BLD MANUAL-RTO: 0 /100 WBC
PLATELET # BLD: 289 K/MCL (ref 140–450)
RBC # BLD: 4.2 MIL/MCL (ref 4–5.2)
WBC # BLD: 6 K/MCL (ref 4.2–11)

## 2020-02-05 ENCOUNTER — WALK IN (OUTPATIENT)
Dept: URGENT CARE | Age: 50
End: 2020-02-05

## 2020-02-05 VITALS — DIASTOLIC BLOOD PRESSURE: 78 MMHG | SYSTOLIC BLOOD PRESSURE: 126 MMHG

## 2020-02-05 DIAGNOSIS — Z01.30 BLOOD PRESSURE CHECK: Primary | ICD-10-CM

## 2020-02-05 PROCEDURE — 99213 OFFICE O/P EST LOW 20 MIN: CPT | Performed by: NURSE PRACTITIONER

## 2020-02-12 ENCOUNTER — WALK IN (OUTPATIENT)
Dept: URGENT CARE | Age: 50
End: 2020-02-12

## 2020-02-12 DIAGNOSIS — L30.9 DERMATITIS: Primary | ICD-10-CM

## 2020-02-12 PROCEDURE — 99213 OFFICE O/P EST LOW 20 MIN: CPT | Performed by: NURSE PRACTITIONER

## 2020-02-12 RX ORDER — TRIAMCINOLONE ACETONIDE 1 MG/G
OINTMENT TOPICAL 2 TIMES DAILY
Qty: 30 G | Refills: 0 | Status: SHIPPED | OUTPATIENT
Start: 2020-02-12

## 2020-02-12 ASSESSMENT — ENCOUNTER SYMPTOMS: COLOR CHANGE: 1

## 2020-06-03 RX ORDER — OMEPRAZOLE 20 MG/1
20 CAPSULE, DELAYED RELEASE ORAL DAILY
Qty: 90 CAPSULE | Refills: 1 | Status: SHIPPED | OUTPATIENT
Start: 2020-06-03 | End: 2021-02-20

## 2020-08-24 ENCOUNTER — HOSPITAL ENCOUNTER (OUTPATIENT)
Dept: MAMMOGRAPHY | Age: 50
Discharge: HOME | End: 2020-08-24
Attending: NURSE PRACTITIONER

## 2020-08-24 DIAGNOSIS — Z12.31 VISIT FOR SCREENING MAMMOGRAM: ICD-10-CM

## 2020-08-24 PROCEDURE — 77063 BREAST TOMOSYNTHESIS BI: CPT | Performed by: RADIOLOGY

## 2020-08-24 PROCEDURE — 77063 BREAST TOMOSYNTHESIS BI: CPT

## 2020-08-24 PROCEDURE — 77067 SCR MAMMO BI INCL CAD: CPT | Performed by: RADIOLOGY

## 2021-01-01 ENCOUNTER — EXTERNAL RECORD (OUTPATIENT)
Dept: OTHER | Age: 51
End: 2021-01-01

## 2021-01-04 ENCOUNTER — APPOINTMENT (OUTPATIENT)
Dept: FAMILY MEDICINE | Age: 51
End: 2021-01-04

## 2021-01-28 RX ORDER — VALACYCLOVIR HYDROCHLORIDE 500 MG/1
500 TABLET, FILM COATED ORAL DAILY
Qty: 100 TABLET | Refills: 3 | Status: SHIPPED | OUTPATIENT
Start: 2021-01-28 | End: 2021-03-25 | Stop reason: DRUGHIGH

## 2021-02-20 RX ORDER — OMEPRAZOLE 20 MG/1
20 CAPSULE, DELAYED RELEASE ORAL DAILY
Qty: 90 CAPSULE | Refills: 0 | Status: SHIPPED | OUTPATIENT
Start: 2021-02-20 | End: 2021-03-25 | Stop reason: SDUPTHER

## 2021-03-17 ENCOUNTER — TELEPHONE (OUTPATIENT)
Dept: FAMILY MEDICINE | Age: 51
End: 2021-03-17

## 2021-03-25 ENCOUNTER — OFFICE VISIT (OUTPATIENT)
Dept: FAMILY MEDICINE | Age: 51
End: 2021-03-25

## 2021-03-25 ENCOUNTER — TELEPHONE (OUTPATIENT)
Dept: SURGERY | Age: 51
End: 2021-03-25

## 2021-03-25 VITALS
HEIGHT: 65 IN | WEIGHT: 141 LBS | RESPIRATION RATE: 16 BRPM | SYSTOLIC BLOOD PRESSURE: 126 MMHG | BODY MASS INDEX: 23.49 KG/M2 | TEMPERATURE: 98.5 F | DIASTOLIC BLOOD PRESSURE: 67 MMHG | HEART RATE: 80 BPM

## 2021-03-25 DIAGNOSIS — K44.9 HIATAL HERNIA: ICD-10-CM

## 2021-03-25 DIAGNOSIS — Z12.4 SCREENING FOR CERVICAL CANCER: ICD-10-CM

## 2021-03-25 DIAGNOSIS — B00.1 RECURRENT HERPES LABIALIS: ICD-10-CM

## 2021-03-25 DIAGNOSIS — K21.9 GASTROESOPHAGEAL REFLUX DISEASE WITHOUT ESOPHAGITIS: ICD-10-CM

## 2021-03-25 DIAGNOSIS — Z12.11 SCREENING FOR COLON CANCER: ICD-10-CM

## 2021-03-25 DIAGNOSIS — Z12.11 SCREEN FOR COLON CANCER: ICD-10-CM

## 2021-03-25 DIAGNOSIS — Z01.812 PRE-PROCEDURAL LABORATORY EXAMINATION: Primary | ICD-10-CM

## 2021-03-25 DIAGNOSIS — Z01.419 WELL WOMAN EXAM WITH ROUTINE GYNECOLOGICAL EXAM: Primary | ICD-10-CM

## 2021-03-25 DIAGNOSIS — R23.2 HOT FLASHES: ICD-10-CM

## 2021-03-25 PROCEDURE — 88175 CYTOPATH C/V AUTO FLUID REDO: CPT | Performed by: INTERNAL MEDICINE

## 2021-03-25 PROCEDURE — 87624 HPV HI-RISK TYP POOLED RSLT: CPT | Performed by: INTERNAL MEDICINE

## 2021-03-25 PROCEDURE — 99396 PREV VISIT EST AGE 40-64: CPT | Performed by: NURSE PRACTITIONER

## 2021-03-25 RX ORDER — VALACYCLOVIR HYDROCHLORIDE 500 MG/1
TABLET, FILM COATED ORAL
Qty: 60 TABLET | Refills: 0 | Status: SHIPPED | OUTPATIENT
Start: 2021-03-25 | End: 2021-09-21 | Stop reason: SDUPTHER

## 2021-03-25 RX ORDER — OMEPRAZOLE 20 MG/1
20 CAPSULE, DELAYED RELEASE ORAL DAILY
Qty: 90 CAPSULE | Refills: 3 | Status: SHIPPED | OUTPATIENT
Start: 2021-03-25 | End: 2021-09-21 | Stop reason: SDUPTHER

## 2021-03-25 ASSESSMENT — PATIENT HEALTH QUESTIONNAIRE - PHQ9
CLINICAL INTERPRETATION OF PHQ2 SCORE: NO FURTHER SCREENING NEEDED
CLINICAL INTERPRETATION OF PHQ9 SCORE: NO FURTHER SCREENING NEEDED
2. FEELING DOWN, DEPRESSED OR HOPELESS: NOT AT ALL
SUM OF ALL RESPONSES TO PHQ9 QUESTIONS 1 AND 2: 0
1. LITTLE INTEREST OR PLEASURE IN DOING THINGS: NOT AT ALL
SUM OF ALL RESPONSES TO PHQ9 QUESTIONS 1 AND 2: 0

## 2021-03-30 ENCOUNTER — TELEPHONE (OUTPATIENT)
Dept: FAMILY MEDICINE | Age: 51
End: 2021-03-30

## 2021-03-30 LAB
CASE RPRT: NORMAL
CYTOLOGY CVX/VAG STUDY: NORMAL
HPV16+18+45 E6+E7MRNA CVX NAA+PROBE: NEGATIVE
Lab: NORMAL
PAP EDUCATIONAL NOTE: NORMAL
SPECIMEN ADEQUACY: NORMAL

## 2021-04-07 VITALS
OXYGEN SATURATION: 96 % | RESPIRATION RATE: 12 BRPM | HEART RATE: 68 BPM | HEIGHT: 65 IN | BODY MASS INDEX: 23.8 KG/M2 | WEIGHT: 142.86 LBS | DIASTOLIC BLOOD PRESSURE: 64 MMHG | TEMPERATURE: 97.7 F | SYSTOLIC BLOOD PRESSURE: 107 MMHG

## 2021-04-12 ENCOUNTER — TELEPHONE (OUTPATIENT)
Dept: FAMILY MEDICINE | Age: 51
End: 2021-04-12

## 2021-06-08 ENCOUNTER — TELEPHONE (OUTPATIENT)
Dept: SURGERY | Age: 51
End: 2021-06-08

## 2021-06-15 ENCOUNTER — TELEPHONE (OUTPATIENT)
Dept: SURGERY | Age: 51
End: 2021-06-15

## 2021-06-15 PROBLEM — Z12.11 SCREEN FOR COLON CANCER: Status: ACTIVE | Noted: 2021-06-15

## 2021-06-16 ENCOUNTER — APPOINTMENT (OUTPATIENT)
Dept: LAB | Age: 51
End: 2021-06-16

## 2021-06-17 ENCOUNTER — ANESTHESIA EVENT (OUTPATIENT)
Dept: SURGERY | Age: 51
End: 2021-06-17

## 2021-06-18 ENCOUNTER — HOSPITAL ENCOUNTER (OUTPATIENT)
Age: 51
Discharge: HOME | End: 2021-06-18
Attending: SURGERY | Admitting: SURGERY

## 2021-06-18 ENCOUNTER — ANESTHESIA (OUTPATIENT)
Dept: SURGERY | Age: 51
End: 2021-06-18

## 2021-06-18 VITALS
RESPIRATION RATE: 12 BRPM | DIASTOLIC BLOOD PRESSURE: 71 MMHG | OXYGEN SATURATION: 100 % | TEMPERATURE: 97.6 F | SYSTOLIC BLOOD PRESSURE: 120 MMHG | HEART RATE: 68 BPM

## 2021-06-18 LAB — HCG UR QL: NEGATIVE

## 2021-06-18 PROCEDURE — 10004185 HB COUNTER-VISIT  CENSUS

## 2021-06-18 PROCEDURE — 45378 DIAGNOSTIC COLONOSCOPY: CPT | Performed by: SURGERY

## 2021-06-18 PROCEDURE — 10002362 HB ANESTH MAC IV 1ST 1/2 HR: Performed by: SURGERY

## 2021-06-18 PROCEDURE — 10003428 HB COLONOSCOPY: Performed by: SURGERY

## 2021-06-18 PROCEDURE — 10002801 HB RX 250 W/O HCPCS: Performed by: NURSE ANESTHETIST, CERTIFIED REGISTERED

## 2021-06-18 PROCEDURE — 84703 CHORIONIC GONADOTROPIN ASSAY: CPT

## 2021-06-18 PROCEDURE — 45378 DIAGNOSTIC COLONOSCOPY: CPT | Performed by: NURSE ANESTHETIST, CERTIFIED REGISTERED

## 2021-06-18 PROCEDURE — 10004316 HB COUNTER-PREP

## 2021-06-18 PROCEDURE — 10004348 HB COUNTER-EVAL PRE-OP

## 2021-06-18 PROCEDURE — 10002807 HB RX 258: Performed by: ANESTHESIOLOGY

## 2021-06-18 PROCEDURE — 10002800 HB RX 250 W HCPCS: Performed by: NURSE ANESTHETIST, CERTIFIED REGISTERED

## 2021-06-18 RX ORDER — METOCLOPRAMIDE HYDROCHLORIDE 5 MG/ML
5 INJECTION INTRAMUSCULAR; INTRAVENOUS EVERY 6 HOURS PRN
Status: DISCONTINUED | OUTPATIENT
Start: 2021-06-18 | End: 2021-06-22 | Stop reason: HOSPADM

## 2021-06-18 RX ORDER — SODIUM CHLORIDE, SODIUM LACTATE, POTASSIUM CHLORIDE, CALCIUM CHLORIDE 600; 310; 30; 20 MG/100ML; MG/100ML; MG/100ML; MG/100ML
INJECTION, SOLUTION INTRAVENOUS CONTINUOUS
Status: DISCONTINUED | OUTPATIENT
Start: 2021-06-18 | End: 2021-06-22 | Stop reason: HOSPADM

## 2021-06-18 RX ORDER — HUMAN INSULIN 100 [IU]/ML
INJECTION, SOLUTION SUBCUTANEOUS
Status: DISCONTINUED | OUTPATIENT
Start: 2021-06-18 | End: 2021-06-22 | Stop reason: HOSPADM

## 2021-06-18 RX ORDER — 0.9 % SODIUM CHLORIDE 0.9 %
2 VIAL (ML) INJECTION EVERY 12 HOURS SCHEDULED
Status: DISCONTINUED | OUTPATIENT
Start: 2021-06-18 | End: 2021-06-22 | Stop reason: HOSPADM

## 2021-06-18 RX ORDER — DEXTROSE MONOHYDRATE 50 MG/ML
INJECTION, SOLUTION INTRAVENOUS CONTINUOUS PRN
Status: DISCONTINUED | OUTPATIENT
Start: 2021-06-18 | End: 2021-06-22 | Stop reason: HOSPADM

## 2021-06-18 RX ORDER — DEXTROSE MONOHYDRATE 25 G/50ML
25 INJECTION, SOLUTION INTRAVENOUS PRN
Status: DISCONTINUED | OUTPATIENT
Start: 2021-06-18 | End: 2021-06-22 | Stop reason: HOSPADM

## 2021-06-18 RX ORDER — DIPHENHYDRAMINE HYDROCHLORIDE 50 MG/ML
25 INJECTION INTRAMUSCULAR; INTRAVENOUS
Status: DISCONTINUED | OUTPATIENT
Start: 2021-06-18 | End: 2021-06-22 | Stop reason: HOSPADM

## 2021-06-18 RX ORDER — LIDOCAINE HYDROCHLORIDE 10 MG/ML
INJECTION, SOLUTION INFILTRATION; PERINEURAL PRN
Status: DISCONTINUED | OUTPATIENT
Start: 2021-06-18 | End: 2021-06-18

## 2021-06-18 RX ORDER — PROPOFOL 10 MG/ML
INJECTION, EMULSION INTRAVENOUS PRN
Status: DISCONTINUED | OUTPATIENT
Start: 2021-06-18 | End: 2021-06-18

## 2021-06-18 RX ORDER — ONDANSETRON 2 MG/ML
4 INJECTION INTRAMUSCULAR; INTRAVENOUS 2 TIMES DAILY PRN
Status: DISCONTINUED | OUTPATIENT
Start: 2021-06-18 | End: 2021-06-22 | Stop reason: HOSPADM

## 2021-06-18 RX ORDER — MIDAZOLAM HYDROCHLORIDE 1 MG/ML
1-2 INJECTION, SOLUTION INTRAMUSCULAR; INTRAVENOUS PRN
Status: DISCONTINUED | OUTPATIENT
Start: 2021-06-18 | End: 2021-06-22 | Stop reason: HOSPADM

## 2021-06-18 RX ORDER — LIDOCAINE HYDROCHLORIDE 10 MG/ML
5-10 INJECTION, SOLUTION INFILTRATION; PERINEURAL PRN
Status: DISCONTINUED | OUTPATIENT
Start: 2021-06-18 | End: 2021-06-22 | Stop reason: HOSPADM

## 2021-06-18 RX ADMIN — PROPOFOL 100 MG: 10 INJECTION, EMULSION INTRAVENOUS at 07:51

## 2021-06-18 RX ADMIN — PROPOFOL 145 MCG/KG/MIN: 10 INJECTION, EMULSION INTRAVENOUS at 07:51

## 2021-06-18 RX ADMIN — SODIUM CHLORIDE, POTASSIUM CHLORIDE, SODIUM LACTATE AND CALCIUM CHLORIDE: 600; 310; 30; 20 INJECTION, SOLUTION INTRAVENOUS at 07:35

## 2021-06-18 RX ADMIN — LIDOCAINE HYDROCHLORIDE 50 MG: 10 INJECTION, SOLUTION INFILTRATION; PERINEURAL at 07:51

## 2021-06-18 ASSESSMENT — ACTIVITIES OF DAILY LIVING (ADL)
ADL_BEFORE_ADMISSION: INDEPENDENT
ADL_SCORE: 12
ADL_SHORT_OF_BREATH: NO
RECENT_DECLINE_ADL: NO
CHRONIC_PAIN_PRESENT: NO
HISTORY OF FALLING IN THE LAST YEAR (PRIOR TO ADMISSION): NO
NEEDS_ASSIST: NO

## 2021-06-18 ASSESSMENT — PAIN SCALES - GENERAL
PAINLEVEL_OUTOF10: 0

## 2021-06-18 ASSESSMENT — COGNITIVE AND FUNCTIONAL STATUS - GENERAL
ARE YOU BLIND OR DO YOU HAVE SERIOUS DIFFICULTY SEEING, EVEN WHEN WEARING GLASSES: NO
ARE YOU DEAF OR DO YOU HAVE SERIOUS DIFFICULTY  HEARING: NO

## 2021-07-30 ENCOUNTER — CLERICAL ORDERS (OUTPATIENT)
Dept: SCHEDULING | Age: 51
End: 2021-07-30

## 2021-07-30 DIAGNOSIS — Z12.31 SCREENING MAMMOGRAM, ENCOUNTER FOR: Primary | ICD-10-CM

## 2021-08-25 ENCOUNTER — HOSPITAL ENCOUNTER (OUTPATIENT)
Dept: MAMMOGRAPHY | Age: 51
Discharge: HOME | End: 2021-08-25
Attending: NURSE PRACTITIONER

## 2021-08-25 DIAGNOSIS — Z12.31 SCREENING MAMMOGRAM, ENCOUNTER FOR: ICD-10-CM

## 2021-08-25 PROCEDURE — 77063 BREAST TOMOSYNTHESIS BI: CPT

## 2021-08-25 PROCEDURE — 77063 BREAST TOMOSYNTHESIS BI: CPT | Performed by: RADIOLOGY

## 2021-08-25 PROCEDURE — 77067 SCR MAMMO BI INCL CAD: CPT | Performed by: RADIOLOGY

## 2021-09-29 PROBLEM — Z12.11 SCREEN FOR COLON CANCER: Status: RESOLVED | Noted: 2021-06-15 | Resolved: 2021-09-29

## 2021-09-30 ENCOUNTER — OFFICE VISIT (OUTPATIENT)
Dept: FAMILY MEDICINE | Age: 51
End: 2021-09-30

## 2021-09-30 VITALS
SYSTOLIC BLOOD PRESSURE: 132 MMHG | BODY MASS INDEX: 23.99 KG/M2 | RESPIRATION RATE: 14 BRPM | DIASTOLIC BLOOD PRESSURE: 78 MMHG | HEIGHT: 65 IN | WEIGHT: 144 LBS | HEART RATE: 64 BPM

## 2021-09-30 DIAGNOSIS — K21.9 GASTROESOPHAGEAL REFLUX DISEASE WITHOUT ESOPHAGITIS: ICD-10-CM

## 2021-09-30 DIAGNOSIS — A60.00 GENITAL HERPES SIMPLEX, UNSPECIFIED SITE: ICD-10-CM

## 2021-09-30 DIAGNOSIS — R23.2 HOT FLASHES: ICD-10-CM

## 2021-09-30 DIAGNOSIS — N39.3 STRESS INCONTINENCE, FEMALE: Primary | ICD-10-CM

## 2021-09-30 PROCEDURE — 99214 OFFICE O/P EST MOD 30 MIN: CPT | Performed by: NURSE PRACTITIONER

## 2021-09-30 RX ORDER — OMEPRAZOLE 20 MG/1
20 CAPSULE, DELAYED RELEASE ORAL DAILY
Qty: 90 CAPSULE | Refills: 3 | Status: SHIPPED | OUTPATIENT
Start: 2021-09-30 | End: 2022-07-14 | Stop reason: ALTCHOICE

## 2021-09-30 RX ORDER — VALACYCLOVIR HYDROCHLORIDE 500 MG/1
TABLET, FILM COATED ORAL
Qty: 60 TABLET | Refills: 0 | Status: SHIPPED | OUTPATIENT
Start: 2021-09-30 | End: 2022-02-07

## 2021-09-30 ASSESSMENT — PATIENT HEALTH QUESTIONNAIRE - PHQ9
SUM OF ALL RESPONSES TO PHQ9 QUESTIONS 1 AND 2: 0
2. FEELING DOWN, DEPRESSED OR HOPELESS: NOT AT ALL
CLINICAL INTERPRETATION OF PHQ9 SCORE: NO FURTHER SCREENING NEEDED
CLINICAL INTERPRETATION OF PHQ2 SCORE: NO FURTHER SCREENING NEEDED
SUM OF ALL RESPONSES TO PHQ9 QUESTIONS 1 AND 2: 0
1. LITTLE INTEREST OR PLEASURE IN DOING THINGS: NOT AT ALL

## 2022-02-07 DIAGNOSIS — A60.00 GENITAL HERPES SIMPLEX, UNSPECIFIED SITE: ICD-10-CM

## 2022-02-07 RX ORDER — VALACYCLOVIR HYDROCHLORIDE 500 MG/1
TABLET, FILM COATED ORAL
Qty: 60 TABLET | Refills: 0 | Status: SHIPPED | OUTPATIENT
Start: 2022-02-07 | End: 2023-05-11 | Stop reason: SDUPTHER

## 2022-03-31 ENCOUNTER — APPOINTMENT (OUTPATIENT)
Dept: FAMILY MEDICINE | Age: 52
End: 2022-03-31

## 2022-03-31 ENCOUNTER — OFFICE VISIT (OUTPATIENT)
Dept: FAMILY MEDICINE | Age: 52
End: 2022-03-31

## 2022-03-31 VITALS
WEIGHT: 142 LBS | HEIGHT: 65 IN | DIASTOLIC BLOOD PRESSURE: 74 MMHG | HEART RATE: 68 BPM | BODY MASS INDEX: 23.66 KG/M2 | SYSTOLIC BLOOD PRESSURE: 116 MMHG | RESPIRATION RATE: 16 BRPM

## 2022-03-31 DIAGNOSIS — N39.3 STRESS INCONTINENCE, FEMALE: ICD-10-CM

## 2022-03-31 DIAGNOSIS — R42 VERTIGO: ICD-10-CM

## 2022-03-31 DIAGNOSIS — Z00.00 WELL WOMAN EXAM WITHOUT GYNECOLOGICAL EXAM: Primary | ICD-10-CM

## 2022-03-31 PROCEDURE — 99396 PREV VISIT EST AGE 40-64: CPT | Performed by: NURSE PRACTITIONER

## 2022-03-31 RX ORDER — MECLIZINE HCL 12.5 MG/1
12.5 TABLET ORAL 3 TIMES DAILY PRN
Qty: 30 TABLET | Refills: 1 | Status: SHIPPED | OUTPATIENT
Start: 2022-03-31

## 2022-03-31 RX ORDER — ONDANSETRON 4 MG/1
4 TABLET, FILM COATED ORAL EVERY 8 HOURS PRN
Qty: 30 TABLET | Refills: 1 | Status: SHIPPED | OUTPATIENT
Start: 2022-03-31

## 2022-03-31 ASSESSMENT — PATIENT HEALTH QUESTIONNAIRE - PHQ9
2. FEELING DOWN, DEPRESSED OR HOPELESS: NOT AT ALL
SUM OF ALL RESPONSES TO PHQ9 QUESTIONS 1 AND 2: 0
SUM OF ALL RESPONSES TO PHQ9 QUESTIONS 1 AND 2: 0
CLINICAL INTERPRETATION OF PHQ2 SCORE: NO FURTHER SCREENING NEEDED
1. LITTLE INTEREST OR PLEASURE IN DOING THINGS: NOT AT ALL

## 2022-06-01 ENCOUNTER — WALK IN (OUTPATIENT)
Dept: URGENT CARE | Age: 52
End: 2022-06-01

## 2022-06-01 DIAGNOSIS — L01.00 IMPETIGO: Primary | ICD-10-CM

## 2022-06-01 PROCEDURE — 99213 OFFICE O/P EST LOW 20 MIN: CPT | Performed by: NURSE PRACTITIONER

## 2022-06-01 ASSESSMENT — ENCOUNTER SYMPTOMS
COLOR CHANGE: 1
FEVER: 0
WOUND: 1

## 2022-06-08 ENCOUNTER — WALK IN (OUTPATIENT)
Dept: URGENT CARE | Age: 52
End: 2022-06-08

## 2022-06-08 DIAGNOSIS — S00.81XD ABRASION OF FACE, SUBSEQUENT ENCOUNTER: Primary | ICD-10-CM

## 2022-06-08 PROCEDURE — 99213 OFFICE O/P EST LOW 20 MIN: CPT | Performed by: NURSE PRACTITIONER

## 2022-06-08 ASSESSMENT — ENCOUNTER SYMPTOMS
FEVER: 0
WOUND: 1

## 2022-06-22 ENCOUNTER — E-ADVICE (OUTPATIENT)
Dept: FAMILY MEDICINE | Age: 52
End: 2022-06-22

## 2022-07-07 ENCOUNTER — IMMUNIZATION (OUTPATIENT)
Dept: FAMILY MEDICINE | Age: 52
End: 2022-07-07

## 2022-07-07 DIAGNOSIS — Z23 NEED FOR COVID-19 VACCINE: Primary | ICD-10-CM

## 2022-07-07 PROCEDURE — 0064A COVID-19 MODERNA 0.25ML BOOSTER VACCINE: CPT | Performed by: NURSE PRACTITIONER

## 2022-07-07 PROCEDURE — 91306 COVID-19 MODERNA 0.25ML BOOSTER VACCINE: CPT | Performed by: NURSE PRACTITIONER

## 2022-07-14 ENCOUNTER — WALK IN (OUTPATIENT)
Dept: URGENT CARE | Age: 52
End: 2022-07-14

## 2022-07-14 VITALS
HEART RATE: 70 BPM | OXYGEN SATURATION: 99 % | SYSTOLIC BLOOD PRESSURE: 108 MMHG | WEIGHT: 145 LBS | TEMPERATURE: 98.3 F | BODY MASS INDEX: 24.16 KG/M2 | DIASTOLIC BLOOD PRESSURE: 72 MMHG | HEIGHT: 65 IN | RESPIRATION RATE: 16 BRPM

## 2022-07-14 DIAGNOSIS — L72.9 INFECTED CYST OF SKIN: Primary | ICD-10-CM

## 2022-07-14 DIAGNOSIS — L08.9 INFECTED CYST OF SKIN: Primary | ICD-10-CM

## 2022-07-14 PROCEDURE — 99214 OFFICE O/P EST MOD 30 MIN: CPT | Performed by: PHYSICIAN ASSISTANT

## 2022-07-14 RX ORDER — SULFAMETHOXAZOLE AND TRIMETHOPRIM 800; 160 MG/1; MG/1
1 TABLET ORAL 2 TIMES DAILY
Qty: 20 TABLET | Refills: 0 | Status: SHIPPED | OUTPATIENT
Start: 2022-07-14 | End: 2022-07-24

## 2022-07-19 ENCOUNTER — WALK IN (OUTPATIENT)
Dept: URGENT CARE | Age: 52
End: 2022-07-19

## 2022-07-19 VITALS
DIASTOLIC BLOOD PRESSURE: 64 MMHG | HEIGHT: 65 IN | BODY MASS INDEX: 24.06 KG/M2 | HEART RATE: 81 BPM | RESPIRATION RATE: 16 BRPM | OXYGEN SATURATION: 97 % | SYSTOLIC BLOOD PRESSURE: 104 MMHG | TEMPERATURE: 98.7 F | WEIGHT: 144.4 LBS

## 2022-07-19 DIAGNOSIS — B02.8 HERPES ZOSTER WITH OTHER COMPLICATION: Primary | ICD-10-CM

## 2022-07-19 PROCEDURE — 99214 OFFICE O/P EST MOD 30 MIN: CPT | Performed by: FAMILY MEDICINE

## 2022-07-19 RX ORDER — VALACYCLOVIR HYDROCHLORIDE 1 G/1
1000 TABLET, FILM COATED ORAL 3 TIMES DAILY
Qty: 21 TABLET | Refills: 0 | Status: SHIPPED | OUTPATIENT
Start: 2022-07-19 | End: 2023-05-11 | Stop reason: ALTCHOICE

## 2022-08-25 ENCOUNTER — HOSPITAL ENCOUNTER (OUTPATIENT)
Dept: MAMMOGRAPHY | Age: 52
Discharge: HOME | End: 2022-08-25
Attending: NURSE PRACTITIONER

## 2022-08-25 DIAGNOSIS — Z12.31 VISIT FOR SCREENING MAMMOGRAM: ICD-10-CM

## 2022-08-25 PROCEDURE — 77067 SCR MAMMO BI INCL CAD: CPT | Performed by: RADIOLOGY

## 2022-08-25 PROCEDURE — 77063 BREAST TOMOSYNTHESIS BI: CPT

## 2022-08-25 PROCEDURE — 77063 BREAST TOMOSYNTHESIS BI: CPT | Performed by: RADIOLOGY

## 2023-04-04 ENCOUNTER — APPOINTMENT (OUTPATIENT)
Dept: FAMILY MEDICINE | Age: 53
End: 2023-04-04

## 2023-05-11 ENCOUNTER — OFFICE VISIT (OUTPATIENT)
Dept: FAMILY MEDICINE | Age: 53
End: 2023-05-11

## 2023-05-11 VITALS
HEIGHT: 66 IN | HEART RATE: 70 BPM | SYSTOLIC BLOOD PRESSURE: 118 MMHG | BODY MASS INDEX: 22.18 KG/M2 | DIASTOLIC BLOOD PRESSURE: 80 MMHG | WEIGHT: 138 LBS | RESPIRATION RATE: 16 BRPM

## 2023-05-11 DIAGNOSIS — Z00.00 WELL WOMAN EXAM WITHOUT GYNECOLOGICAL EXAM: Primary | ICD-10-CM

## 2023-05-11 PROCEDURE — 99396 PREV VISIT EST AGE 40-64: CPT | Performed by: NURSE PRACTITIONER

## 2023-05-11 RX ORDER — VALACYCLOVIR HYDROCHLORIDE 500 MG/1
TABLET, FILM COATED ORAL
Qty: 90 TABLET | Refills: 1 | Status: SHIPPED | OUTPATIENT
Start: 2023-05-11

## 2023-07-25 DIAGNOSIS — Z12.31 VISIT FOR SCREENING MAMMOGRAM: Primary | ICD-10-CM

## 2023-08-28 ENCOUNTER — HOSPITAL ENCOUNTER (OUTPATIENT)
Dept: MAMMOGRAPHY | Age: 53
Discharge: HOME | End: 2023-08-28
Attending: NURSE PRACTITIONER

## 2023-08-28 DIAGNOSIS — Z12.31 VISIT FOR SCREENING MAMMOGRAM: ICD-10-CM

## 2023-08-28 PROCEDURE — 77067 SCR MAMMO BI INCL CAD: CPT | Performed by: RADIOLOGY

## 2023-08-28 PROCEDURE — 77063 BREAST TOMOSYNTHESIS BI: CPT

## 2023-08-28 PROCEDURE — 77063 BREAST TOMOSYNTHESIS BI: CPT | Performed by: RADIOLOGY

## 2023-11-08 ENCOUNTER — OFFICE VISIT (OUTPATIENT)
Dept: URGENT CARE | Age: 53
End: 2023-11-08

## 2023-11-08 DIAGNOSIS — L01.00 IMPETIGO: Primary | ICD-10-CM

## 2023-11-08 PROCEDURE — 99213 OFFICE O/P EST LOW 20 MIN: CPT | Performed by: NURSE PRACTITIONER

## 2023-11-08 ASSESSMENT — ENCOUNTER SYMPTOMS
FEVER: 0
WOUND: 1
RHINORRHEA: 1

## 2024-01-31 RX ORDER — VALACYCLOVIR HYDROCHLORIDE 500 MG/1
TABLET, FILM COATED ORAL
Qty: 90 TABLET | Refills: 1 | Status: SHIPPED | OUTPATIENT
Start: 2024-01-31

## 2024-03-06 ASSESSMENT — ENCOUNTER SYMPTOMS
UNEXPECTED WEIGHT CHANGE: 0
WOUND: 0
FREQUENCY: 0
SHORTNESS OF BREATH: 0
POLYPHAGIA: 0
NECK PAIN: 0
ADENOPATHY: 0
EYE DISCHARGE: 0
SORE THROAT: 0
FATIGUE: 0
POLYDIPSIA: 0
EYE REDNESS: 0
CONFUSION: 0
VOMITING: 0
TROUBLE SWALLOWING: 0
DYSURIA: 0
EYE PAIN: 0
COUGH: 0
PALPITATIONS: 0
HEMATURIA: 0
APPETITE CHANGE: 0
HALLUCINATIONS: 0
BRUISES/BLEEDS EASILY: 0
DIZZINESS: 0
FEVER: 0
ABDOMINAL PAIN: 0
BACK PAIN: 0
CHILLS: 0
BLOOD IN STOOL: 0
HEADACHES: 0

## 2024-03-07 ENCOUNTER — APPOINTMENT (OUTPATIENT)
Dept: PRIMARY CARE | Facility: CLINIC | Age: 54
End: 2024-03-07
Payer: COMMERCIAL

## 2024-03-07 NOTE — PROGRESS NOTES
Subjective   Patient ID: Dacia Little is a 53 y.o. female who presents for No chief complaint on file..    evelin-last pcp, gi, ob gyn, others?  Sees dr echols and dr gayle    Any forms to fill out?  last physical  last labs  is pt fasting?  Last tdap  Last Shingrix  last colonoscopy3/21/22; due 3/2027  last pap  Need evelin if in the last 5yrs  Does pt smoke?  If yes, when did she start? And how much does she smoke?  Does pt want to discuss quitting smoking?    Any other questions or concerns that you want to discuss today?    Family history form      No care team member to display    HPI  Last labs-1/2022 ldl 110, cmp nl, tsh nl, A1c 5.0  Due for labs- all    Cholesterol   Date Value Ref Range Status   01/25/2022 203 (H) 0 - 199 mg/dL Final     Comment:     .      AGE      DESIRABLE   BORDERLINE HIGH   HIGH     0-19 Y     0 - 169       170 - 199     >/= 200    20-24 Y     0 - 189       190 - 224     >/= 225         >24 Y     0 - 199       200 - 239     >/= 240   **All ranges are based on fasting samples. Specific   therapeutic targets will vary based on patient-specific   cardiac risk.  .   Pediatric guidelines reference:Pediatrics 2011, 128(S5).   Adult guidelines reference: NCEP ATPIII Guidelines,     REG 2001, 258:2486-97  .   Venipuncture immediately after or during the    administration of Metamizole may lead to falsely   low results. Testing should be performed immediately   prior to Metamizole dosing.       Triglycerides   Date Value Ref Range Status   01/25/2022 96 0 - 149 mg/dL Final     Comment:     .      AGE      DESIRABLE   BORDERLINE HIGH   HIGH     VERY HIGH   0 D-90 D    19 - 174         ----         ----        ----  91 D- 9 Y     0 -  74        75 -  99     >/= 100      ----    10-19 Y     0 -  89        90 - 129     >/= 130      ----    20-24 Y     0 - 114       115 - 149     >/= 150      ----         >24 Y     0 - 149       150 - 199    200- 499    >/= 500  .   Venipuncture immediately after or  "during the    administration of Metamizole may lead to falsely   low results. Testing should be performed immediately   prior to Metamizole dosing.       HDL   Date Value Ref Range Status   01/25/2022 73.6 mg/dL Final     Comment:     .      AGE      VERY LOW   LOW     NORMAL    HIGH       0-19 Y       < 35   < 40     40-45     ----    20-24 Y       ----   < 40       >45     ----      >24 Y       ----   < 40     40-60      >60  .       No results found for: \"LDL\"  TSH   Date Value Ref Range Status   01/25/2022 1.75 0.44 - 3.98 mIU/L Final     Comment:      TSH testing is performed using different testing    methodology at [facility] than at other    [facility] hospitals. Direct result comparisons should    only be made within the same method.       No results found for: \"A1C\"  No components found for: \"POCA1C\"  No results found for: \"ALBUR\"  No components found for: \"POCALBUR\"      Other concerns:    bps at home- ***    ER/urgicare visits in the last year- ***  Hospitalizations in the last year- ***      last Pap-  H/o abn pap-     ovarian, endometrial, cervical, uterine ca-    Current birth control method-  No change in contraception desired      last mammo- (40-75/90)12/1/23  Self breast exams-     br ca-    last colonoscopy/cologuard/FIT (45-75) 3/21/22; due 3/2027   colon ca-      Exercise- ***  Diet-***   There is no height or weight on file to calculate BMI.    last eye dr appt-   No vision issues    last dental appt- ***    BMs-regular  Sleep-able to fall asleep and stay asleep; no snoring or apnea  no cp, swelling, sob, abd pain, n/v/d/c, blood in stool or black stools  STI testing including hiv (age 15-65) and hep c screening (18-79)-***          There is no immunization history on file for this patient.    Tdap-  Shingles vaccine-      fractures in lifetime-***  Anyone with osteoporosis in the family-     heart attack, heart surgery-***   stroke-***    The ASCVD Risk score (Ricky WOODARD, " et al., 2019) failed to calculate for the following reasons:    The systolic blood pressure is missing    The smoking status is invalid  Coronary Artery Calcium score:  This test is recommended for men 45 or older and women 55 or older without a history of heart disease and have 1 risk factor (high LDL cholesterol, low HDL cholesterol, high blood pressure, smoker (current or past), type 2 diabetes, IBD, lupus, RA, ankylosing spondylitis, psoriasis or family history of  heart disease <55yrs in dad, brother or child or <65yrs in mom, sister, or child.)       Review of Systems   Constitutional:  Negative for appetite change, chills, fatigue, fever and unexpected weight change.   HENT:  Negative for congestion, ear pain, sore throat and trouble swallowing.    Eyes:  Negative for pain, discharge and redness.   Respiratory:  Negative for cough and shortness of breath.    Cardiovascular:  Negative for chest pain and palpitations.   Gastrointestinal:  Negative for abdominal pain, blood in stool and vomiting.   Endocrine: Negative for polydipsia, polyphagia and polyuria.   Genitourinary:  Negative for dysuria, frequency, hematuria and urgency.   Musculoskeletal:  Negative for back pain and neck pain.   Skin:  Negative for rash and wound.   Allergic/Immunologic: Negative for immunocompromised state.   Neurological:  Negative for dizziness, syncope and headaches.   Hematological:  Negative for adenopathy. Does not bruise/bleed easily.   Psychiatric/Behavioral:  Negative for confusion and hallucinations.      Objective   There were no vitals taken for this visit.   BP Readings from Last 3 Encounters:   01/25/22 116/72     Wt Readings from Last 3 Encounters:   01/25/22 73.5 kg (162 lb)           Physical Exam  Constitutional:       General: She is not in acute distress.     Appearance: Normal appearance. She is not ill-appearing.   HENT:      Head: Normocephalic.      Right Ear: Tympanic membrane, ear canal and external ear  normal.      Left Ear: Tympanic membrane, ear canal and external ear normal.      Nose: Nose normal.      Mouth/Throat:      Mouth: Mucous membranes are moist.      Pharynx: Oropharynx is clear.   Eyes:      Extraocular Movements: Extraocular movements intact.      Conjunctiva/sclera: Conjunctivae normal.      Pupils: Pupils are equal, round, and reactive to light.   Cardiovascular:      Rate and Rhythm: Normal rate and regular rhythm.      Heart sounds: Normal heart sounds. No murmur heard.  Pulmonary:      Effort: Pulmonary effort is normal. No respiratory distress.      Breath sounds: Normal breath sounds. No wheezing, rhonchi or rales.   Abdominal:      General: Bowel sounds are normal.      Palpations: Abdomen is soft. There is no mass.      Tenderness: There is no abdominal tenderness.   Musculoskeletal:         General: No swelling or tenderness. Normal range of motion.      Cervical back: Normal range of motion and neck supple.      Right lower leg: No edema.      Left lower leg: No edema.   Skin:     General: Skin is warm.      Findings: No rash.   Neurological:      General: No focal deficit present.      Mental Status: She is alert and oriented to person, place, and time.      Cranial Nerves: No cranial nerve deficit.      Motor: No weakness.   Psychiatric:         Mood and Affect: Mood normal.         Behavior: Behavior normal.     Assessment/Plan   {Assess/PlanSmartLinks:12467}

## 2024-04-19 ENCOUNTER — OFFICE VISIT (OUTPATIENT)
Dept: PRIMARY CARE | Facility: CLINIC | Age: 54
End: 2024-04-19
Payer: COMMERCIAL

## 2024-04-19 VITALS
DIASTOLIC BLOOD PRESSURE: 71 MMHG | HEART RATE: 76 BPM | HEIGHT: 67 IN | SYSTOLIC BLOOD PRESSURE: 117 MMHG | OXYGEN SATURATION: 94 % | WEIGHT: 173 LBS | TEMPERATURE: 97.5 F | BODY MASS INDEX: 27.15 KG/M2

## 2024-04-19 DIAGNOSIS — L85.3 DRY SKIN: ICD-10-CM

## 2024-04-19 DIAGNOSIS — M25.552 LEFT HIP PAIN: ICD-10-CM

## 2024-04-19 DIAGNOSIS — M25.50 ARTHRALGIA, UNSPECIFIED JOINT: ICD-10-CM

## 2024-04-19 DIAGNOSIS — B35.1 ONYCHOMYCOSIS: ICD-10-CM

## 2024-04-19 DIAGNOSIS — D17.0 LIPOMA OF FACE: ICD-10-CM

## 2024-04-19 DIAGNOSIS — M79.10 MYALGIA: ICD-10-CM

## 2024-04-19 DIAGNOSIS — H91.93 BILATERAL HEARING LOSS, UNSPECIFIED HEARING LOSS TYPE: ICD-10-CM

## 2024-04-19 DIAGNOSIS — Z00.00 ROUTINE GENERAL MEDICAL EXAMINATION AT A HEALTH CARE FACILITY: Primary | ICD-10-CM

## 2024-04-19 PROBLEM — E07.9 MASS OF THYROID GLAND: Status: ACTIVE | Noted: 2024-04-19

## 2024-04-19 LAB
NON-UH HIE A/G RATIO: 1.5
NON-UH HIE ALB: 4.3 G/DL (ref 3.4–5)
NON-UH HIE ALK PHOS: 56 UNIT/L (ref 45–117)
NON-UH HIE BASO COUNT: 0.04 X1000 (ref 0–0.2)
NON-UH HIE BASOS %: 0.8 %
NON-UH HIE BILIRUBIN, TOTAL: 0.5 MG/DL (ref 0.3–1.2)
NON-UH HIE BUN/CREAT RATIO: 11.2
NON-UH HIE BUN: 9 MG/DL (ref 9–23)
NON-UH HIE CALCIUM: 9.5 MG/DL (ref 8.7–10.4)
NON-UH HIE CALCULATED LDL CHOLESTEROL: 135 MG/DL (ref 60–130)
NON-UH HIE CALCULATED OSMOLALITY: 273 MOSM/KG (ref 275–295)
NON-UH HIE CHLORIDE: 104 MMOL/L (ref 98–107)
NON-UH HIE CHOLESTEROL: 229 MG/DL (ref 100–200)
NON-UH HIE CO2, VENOUS: 28 MMOL/L (ref 20–31)
NON-UH HIE CPK: 133 UNIT/L (ref 34–145)
NON-UH HIE CREATININE: 0.8 MG/DL (ref 0.5–0.8)
NON-UH HIE DIFF?: NO
NON-UH HIE EOS COUNT: 0.09 X1000 (ref 0–0.5)
NON-UH HIE EOSIN %: 1.7 %
NON-UH HIE GFR AA: >60
NON-UH HIE GLOBULIN: 2.9 G/DL
NON-UH HIE GLOMERULAR FILTRATION RATE: >60 ML/MIN/1.73M?
NON-UH HIE GLUCOSE: 81 MG/DL (ref 74–106)
NON-UH HIE GOT: 17 UNIT/L (ref 15–37)
NON-UH HIE GPT: 10 UNIT/L (ref 10–49)
NON-UH HIE HCT: 39.8 % (ref 36–46)
NON-UH HIE HDL CHOLESTEROL: 76 MG/DL (ref 40–60)
NON-UH HIE HGB: 13.5 G/DL (ref 12–16)
NON-UH HIE INSTR WBC: 5.4
NON-UH HIE K: 4.2 MMOL/L (ref 3.5–5.1)
NON-UH HIE LYMPH %: 30.2 %
NON-UH HIE LYMPH COUNT: 1.62 X1000 (ref 1.2–4.8)
NON-UH HIE MCH: 32.4 PG (ref 27–34)
NON-UH HIE MCHC: 33.9 G/DL (ref 32–37)
NON-UH HIE MCV: 95.5 FL (ref 80–100)
NON-UH HIE MONO %: 7.4 %
NON-UH HIE MONO COUNT: 0.4 X1000 (ref 0.1–1)
NON-UH HIE MPV: 7.6 FL (ref 7.4–10.4)
NON-UH HIE NA: 138 MMOL/L (ref 135–145)
NON-UH HIE NEUTROPHIL %: 59.8 %
NON-UH HIE NEUTROPHIL COUNT (ANC): 3.21 X1000 (ref 1.4–8.8)
NON-UH HIE NUCLEATED RBC: 0 /100WBC
NON-UH HIE PLATELET: 210 X10 (ref 150–450)
NON-UH HIE RBC: 4.17 X10 (ref 4.2–5.4)
NON-UH HIE RDW: 13.2 % (ref 11.5–14.5)
NON-UH HIE SED RATE WESTERGREN: 8 MM/HR (ref 0–30)
NON-UH HIE TOTAL CHOL/HDL CHOL RATIO: 3
NON-UH HIE TOTAL PROTEIN: 7.2 G/DL (ref 5.7–8.2)
NON-UH HIE TRIGLYCERIDES: 90 MG/DL (ref 30–150)
NON-UH HIE TSH: 1.38 UIU/ML (ref 0.55–4.78)
NON-UH HIE WBC: 5.4 X10 (ref 4.5–11)

## 2024-04-19 PROCEDURE — 3008F BODY MASS INDEX DOCD: CPT | Performed by: NURSE PRACTITIONER

## 2024-04-19 PROCEDURE — 99213 OFFICE O/P EST LOW 20 MIN: CPT | Performed by: NURSE PRACTITIONER

## 2024-04-19 PROCEDURE — 99396 PREV VISIT EST AGE 40-64: CPT | Performed by: NURSE PRACTITIONER

## 2024-04-19 RX ORDER — BISMUTH SUBSALICYLATE 262 MG
1 TABLET,CHEWABLE ORAL DAILY
COMMUNITY

## 2024-04-19 ASSESSMENT — ENCOUNTER SYMPTOMS
BACK PAIN: 0
HEMATURIA: 0
SHORTNESS OF BREATH: 0
SORE THROAT: 0
UNEXPECTED WEIGHT CHANGE: 0
ADENOPATHY: 0
CONFUSION: 0
BLOOD IN STOOL: 0
TROUBLE SWALLOWING: 0
EYE PAIN: 0
FEVER: 0
EYE REDNESS: 0
COUGH: 0
PALPITATIONS: 0
CHILLS: 0
POLYPHAGIA: 0
NECK PAIN: 0
BRUISES/BLEEDS EASILY: 0
HEADACHES: 0
FATIGUE: 0
ABDOMINAL PAIN: 0
POLYDIPSIA: 0
WOUND: 0
EYE DISCHARGE: 0
VOMITING: 0
DIZZINESS: 0
FREQUENCY: 0
HALLUCINATIONS: 0
APPETITE CHANGE: 0
DYSURIA: 0

## 2024-04-19 ASSESSMENT — PATIENT HEALTH QUESTIONNAIRE - PHQ9
SUM OF ALL RESPONSES TO PHQ9 QUESTIONS 1 AND 2: 0
2. FEELING DOWN, DEPRESSED OR HOPELESS: NOT AT ALL
1. LITTLE INTEREST OR PLEASURE IN DOING THINGS: NOT AT ALL

## 2024-04-19 NOTE — PATIENT INSTRUCTIONS
Return for vaccines-tdap and shingrix    See audiologist    Derm  for toenails and  lump on back and dry skin    Hydrocortisone cream otc thin layer twice a day    Labs for inflammation  For hip: xray hip and physical therapy    1400cal at the least  Dr fermin-blog - The '[employer] [facility]  Consider wt loss med-5 meds are FDA approved for wt loss  Need better sleep    See eye dr        Handouts given to pt:  physical handout        Labs:    You can use the lab in our building when fasting. The hrs are: Monday-Friday, 7 a.m. - 5 p.m., Saturday 8 a.m. - 12 noon.   No appt needed, BUT YOU DO NEED THE PAPER ORDER.    Fasting is no food, drink, gum or mints other than water for 12 hrs.   Results will be back in 2-3 business days for most labs. It is always recommended for any orders (labs, xrays, ultrasounds,MRI, ct scan, procedures etc) to check with your insurance provider for expected costs or expenses to you.         You will get your results via phone from my medical assistant if you do not have MyChart.  OR  You will get your results via [employer]    If a result is urgent, I will call to speak to you.    Vaccines:    Will get shingrix and tdap another time.    General recommendations:  Exercise-cardio 4-5d/wk 30min each day  Diet-Breakfast-toast (my favorite Sherry Kaufman Delighful Multigrain or Guillermo's Killer Bread Good Seed thin-sliced)/bagel/English muffin-whole wheat flour as a 1st ingredient or cereal/oatmeal/granola bar-fiber 4g or more or protein like eggs or peanut butter; optional veggies  Lunch-protein, 1/2c carb or 2 slices bread, veg 1c  Dinner-protein, fist sized carb, veg 1c  Fruit 2 a day  Dairy 2 a day-milk, soy milk, almond milk, cheese, yogurt, cottage cheese  Snacks-Protein-hard boiled egg, nuts (walnuts/almonds/pecans/pistachios 1/4c), hummus, beef/deer jerky or meat sticks; vegetable, fruit, dairy-milk(1%, skim, almond, soy)/cheese (not a lot of cheddar)/yogurt (Greek is best-my favorite Dannon Fruit on the  Bottom Greek)/cottage cheese 2%; triscuits/ popcorn/wheat thins have a lot of fiber; follow serving size on bag/box/container  increase water  Limit alcohol to 1 drink per day for women and 2 drinks per day for men (1 drink=12oz beer or 5oz wine or 1 1/2oz liquor)  Calcium: 500mg 1 twice a day if age 50 and younger and 600mg 1 twice a day if over age 50 (calcium citrate can be taken without food)  Vitamin D: 800-5000 IU/day  Limit salt to <2300mg a day if age 50 and under and <1500mg a day if over age 50/have high bp or diabetes or kidney disease  Recommend folate for childbearing age women 0.4mg per day (can be found in a multivitamin)  Recommend 18mg/dL of iron a day if age 50 and under and 8mg/dL a day if over age 50; take on an empty stomach at bedtime  Use sunscreen   Wear seatbelt  Recommend safe sex practices: using condoms everytime you have sex, discuss with a new partner about their past partners/history of STDs/drug use, avoid drinking alcohol or using drugs as this increases the chance that you will participate in high-risk sex, for oral sex help protect your mouth by having your partner use a condom (male or female), women should not douche after sex, be aware of your partner's body and your body-look for signs of a sore, blister, rash, or discharge, and have regular exams and periodic tests for STDs.  No distracted driving  No driving when under influence of substances  Wear a seatbelt  Eye dr every 1-2yrs  Dentist every 6-12 mon  Tetanus shot every 10yrs  Recommend flu vaccine in the fall  Appt in  1 year for physical      I will communicate with you via Swipp regarding messages and results. If you need help with this, you can call the support line at [phone].    IT WAS A PLEASURE TO SEE YOU TODAY. THANK YOU FOR CHOOSING US FOR YOUR HEALTHCARE NEEDS.

## 2024-04-19 NOTE — PROGRESS NOTES
Subjective   Patient ID: Dacia Little is a 53 y.o. female who presents for Annual Exam.      new pt to me-evelin-any other providers pt sees besides below?  No   kinza Palm    Any forms to fill out? No     last labs 2 years ago   is pt fasting? Yes   *Last tdap has been in the last 10 years -will hold  Last Shingrix never got- will hold  last colonoscopy3/21/22; due 3/2027  last pap 10/17/23; due 10/2028  last mammo12/1/23  Does pt smoke? Yes   If yes how much and when did she start? Half a pack, high school        Any other questions or concerns that you want to discuss today? no      Family history form      No care team member to display    HPI  Last labs-1/2022 ldl 110, A1c 5, tsh nl, cmp nl  Due for labs- all    Cholesterol   Date Value Ref Range Status   01/25/2022 203 (H) 0 - 199 mg/dL Final     Comment:     .      AGE      DESIRABLE   BORDERLINE HIGH   HIGH     0-19 Y     0 - 169       170 - 199     >/= 200    20-24 Y     0 - 189       190 - 224     >/= 225         >24 Y     0 - 199       200 - 239     >/= 240   **All ranges are based on fasting samples. Specific   therapeutic targets will vary based on patient-specific   cardiac risk.  .   Pediatric guidelines reference:Pediatrics 2011, 128(S5).   Adult guidelines reference: NCEP ATPIII Guidelines,     REG 2001, 258:2486-97  .   Venipuncture immediately after or during the    administration of Metamizole may lead to falsely   low results. Testing should be performed immediately   prior to Metamizole dosing.       Triglycerides   Date Value Ref Range Status   01/25/2022 96 0 - 149 mg/dL Final     Comment:     .      AGE      DESIRABLE   BORDERLINE HIGH   HIGH     VERY HIGH   0 D-90 D    19 - 174         ----         ----        ----  91 D- 9 Y     0 -  74        75 -  99     >/= 100      ----    10-19 Y     0 -  89        90 - 129     >/= 130      ----    20-24 Y     0 - 114       115 - 149     >/= 150      ----         >24 Y     0 - 149       150 -  "199    200- 499    >/= 500  .   Venipuncture immediately after or during the    administration of Metamizole may lead to falsely   low results. Testing should be performed immediately   prior to Metamizole dosing.       HDL   Date Value Ref Range Status   01/25/2022 73.6 mg/dL Final     Comment:     .      AGE      VERY LOW   LOW     NORMAL    HIGH       0-19 Y       < 35   < 40     40-45     ----    20-24 Y       ----   < 40       >45     ----      >24 Y       ----   < 40     40-60      >60  .       No results found for: \"LDL\"  TSH   Date Value Ref Range Status   01/25/2022 1.75 0.44 - 3.98 mIU/L Final     Comment:      TSH testing is performed using different testing    methodology at [facility] than at other    [facility]. Direct result comparisons should    only be made within the same method.       No results found for: \"A1C\"  No components found for: \"POCA1C\"  No results found for: \"ALBUR\"  No components found for: \"POCALBUR\"      Other concerns:Hearing problems-had tone issue per last hearing test yrs ago  Saw Dr padilla  Has tinnitus and chirping too    lump on back -lf upper back    Bilat gr toes with white    Dry skin in front of ears  and scalp line by forehead    Jt pain-wrists, knees, ankles; and lf hip and down lf leg to arch of foot-spasm on off;worse if standing a lot  No arthritis issues in family    Wt gain    bps at home- none    ER/urgicare visits in the last year- none  Hospitalizations in the last year- none      last Pap- 10/17/23; due 10/2028  H/o abn pap-in the past    FH ovarian, cervical, uterine ca-none    last mammo- (40-75/90) 12/1/23     br ca-none    last colonoscopy/cologuard/FIT (45-75) 3/21/22; due 3/2027   colon ca-pat gm        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    last eye dr jones- contacts, >2yrs  No vision issues    last dental " appt- within 6mon    BMs-regular-ibs-c  Sleep-able to fall asleep but hard to stay asleep-MN to 7p-not sleep the entire time; no snoring or apnea  no cp, swelling, sob, abd pain, n/v/d/c, blood in stool or black stools  STI testing including hiv (age 15-65) and hep c screening (18-79)-declines        Immunization History   Administered Date(s) Administered    Pfizer Purple Cap SARS-CoV-2 03/21/2021, 04/13/2021       Tdap-will hold off today  Shingles vaccine-will hold off today      fractures in lifetime-none  Anyone with osteoporosis in the family-none    FH heart attack, heart surgery-mat uncle, mat aunt  FH stroke-none    The 10-year ASCVD risk score (Ricky WOODARD, et al., 2019) is: 2.7%    Values used to calculate the score:      Age: 53 years      Sex: Female      Is Non- : No      Diabetic: No      Tobacco smoker: Yes      Systolic Blood Pressure: 117 mmHg      Is BP treated: No      HDL Cholesterol: 73.6 mg/dL      Total Cholesterol: 203 mg/dL  Coronary Artery Calcium score:  This test is recommended for men 45 or older and women 55 or older without a history of heart disease and have 1 risk factor (high LDL cholesterol, low HDL cholesterol, high blood pressure, smoker (current or past), type 2 diabetes, IBD, lupus, RA, ankylosing spondylitis, psoriasis or family history of  heart disease <55yrs in dad, brother or child or <65yrs in mom, sister, or child.)       Review of Systems   Constitutional:  Negative for appetite change, chills, fatigue, fever and unexpected weight change.   HENT:  Negative for congestion, ear pain, sore throat and trouble swallowing.    Eyes:  Negative for pain, discharge and redness.   Respiratory:  Negative for cough and shortness of breath.    Cardiovascular:  Negative for chest pain and palpitations.   Gastrointestinal:  Negative for abdominal pain, blood in stool and vomiting.   Endocrine: Negative for polydipsia, polyphagia and polyuria.   Genitourinary:   Negative for dysuria, frequency, hematuria and urgency.   Musculoskeletal:  Negative for back pain and neck pain.   Skin:  Negative for rash and wound.   Allergic/Immunologic: Negative for immunocompromised state.   Neurological:  Negative for dizziness, syncope and headaches.   Hematological:  Negative for adenopathy. Does not bruise/bleed easily.   Psychiatric/Behavioral:  Negative for confusion and hallucinations.        Objective   Visit Vitals  /71   Pulse 76   Temp 36.4 °C (97.5 °F)      BP Readings from Last 3 Encounters:   04/19/24 117/71   01/25/22 116/72     Wt Readings from Last 3 Encounters:   04/19/24 78.5 kg (173 lb)   01/25/22 73.5 kg (162 lb)           Physical Exam  Constitutional:       General: She is not in acute distress.     Appearance: Normal appearance. She is not ill-appearing.   HENT:      Head: Normocephalic.      Right Ear: Tympanic membrane, ear canal and external ear normal.      Left Ear: Tympanic membrane, ear canal and external ear normal.      Nose: Nose normal.      Mouth/Throat:      Mouth: Mucous membranes are moist.      Pharynx: Oropharynx is clear.   Eyes:      Extraocular Movements: Extraocular movements intact.      Conjunctiva/sclera: Conjunctivae normal.      Pupils: Pupils are equal, round, and reactive to light.   Cardiovascular:      Rate and Rhythm: Normal rate and regular rhythm.      Heart sounds: Normal heart sounds. No murmur heard.  Pulmonary:      Effort: Pulmonary effort is normal. No respiratory distress.      Breath sounds: Normal breath sounds. No wheezing, rhonchi or rales.   Abdominal:      General: Bowel sounds are normal.      Palpations: Abdomen is soft. There is no mass.      Tenderness: There is no abdominal tenderness.   Musculoskeletal:         General: No swelling or tenderness. Normal range of motion.      Cervical back: Normal range of motion and neck supple.      Right lower leg: No edema.      Left lower leg: No edema.   Skin:     General:  Skin is warm.      Findings: No rash.   Neurological:      General: No focal deficit present.      Mental Status: She is alert and oriented to person, place, and time.      Cranial Nerves: No cranial nerve deficit.      Motor: No weakness.   Psychiatric:         Mood and Affect: Mood normal.         Behavior: Behavior normal.         Assessment/Plan   Diagnoses and all orders for this visit:  Routine general medical examination at a health care facility  -     Comprehensive Metabolic Panel; Future  -     Lipid Panel; Future  -     TSH with reflex to Free T4 if abnormal; Future  Onychomycosis  -     Referral to Dermatology  Myalgia  -     CBC and Auto Differential; Future  -     Creatine Kinase; Future  Arthralgia, unspecified joint  -     CBC and Auto Differential; Future  -     Rheumatoid Factor; Future  -     Sedimentation Rate; Future  -     [place] with Reflex to SHANIA; Future  Bilateral hearing loss, unspecified hearing loss type  -     Referral to Audiology; Future  Lipoma of face  -     Referral to Dermatology  Dry skin  -     CBC and Auto Differential; Future  -     Referral to Dermatology  Left hip pain  -     CBC and Auto Differential; Future  -     Referral to Physical Therapy; Future  -     Referral to Physical Therapy; Future  -     XR hip left with pelvis when performed 2 or 3 views; Future  Other orders  -     Follow Up In Primary Care - Health Maintenance; Future

## 2024-04-22 ENCOUNTER — APPOINTMENT (OUTPATIENT)
Dept: PRIMARY CARE | Facility: CLINIC | Age: 54
End: 2024-04-22
Payer: COMMERCIAL

## 2024-04-22 LAB — NON-UH HIE ANTI-NUCLEAR ANTIBODY: NEGATIVE

## 2024-04-23 LAB — NON-UH HIE RHEUMATOID FACTOR: NEGATIVE

## 2024-05-14 ENCOUNTER — APPOINTMENT (OUTPATIENT)
Dept: FAMILY MEDICINE | Age: 54
End: 2024-05-14

## 2024-05-17 ENCOUNTER — APPOINTMENT (OUTPATIENT)
Dept: FAMILY MEDICINE | Age: 54
End: 2024-05-17

## 2024-05-17 VITALS
WEIGHT: 145.25 LBS | DIASTOLIC BLOOD PRESSURE: 70 MMHG | HEART RATE: 80 BPM | BODY MASS INDEX: 23.34 KG/M2 | SYSTOLIC BLOOD PRESSURE: 122 MMHG | HEIGHT: 66 IN | RESPIRATION RATE: 15 BRPM

## 2024-05-17 DIAGNOSIS — N95.1 MENOPAUSAL SYNDROME (HOT FLASHES): ICD-10-CM

## 2024-05-17 DIAGNOSIS — R47.89 WORD FINDING DIFFICULTY: ICD-10-CM

## 2024-05-17 DIAGNOSIS — F43.9 STRESS: ICD-10-CM

## 2024-05-17 DIAGNOSIS — N94.10 DYSPAREUNIA, FEMALE: ICD-10-CM

## 2024-05-17 DIAGNOSIS — B00.1 RECURRENT COLD SORES: ICD-10-CM

## 2024-05-17 DIAGNOSIS — Z86.19 HISTORY OF HERPES GENITALIS: ICD-10-CM

## 2024-05-17 DIAGNOSIS — R73.01 IFG (IMPAIRED FASTING GLUCOSE): ICD-10-CM

## 2024-05-17 DIAGNOSIS — R06.83 SNORING: ICD-10-CM

## 2024-05-17 DIAGNOSIS — N39.3 STRESS INCONTINENCE: ICD-10-CM

## 2024-05-17 DIAGNOSIS — R61 NIGHT SWEATS: ICD-10-CM

## 2024-05-17 DIAGNOSIS — Z00.00 ANNUAL PHYSICAL EXAM: Primary | ICD-10-CM

## 2024-05-17 RX ORDER — VALACYCLOVIR HYDROCHLORIDE 500 MG/1
TABLET, FILM COATED ORAL
Qty: 90 TABLET | Refills: 1 | Status: SHIPPED | OUTPATIENT
Start: 2024-05-17

## 2024-05-17 ASSESSMENT — ANXIETY QUESTIONNAIRES
GAD7 TOTAL SCORE: 5
7. FEELING AFRAID AS IF SOMETHING AWFUL MIGHT HAPPEN: 1
IF YOU CHECKED OFF ANY PROBLEMS ON THIS QUESTIONNAIRE, HOW DIFFICULT HAVE THESE PROBLEMS MADE IT FOR YOU TO DO YOUR WORK, TAKE CARE OF THINGS AT HOME, OR GET ALONG WITH OTHER PEOPLE: SOMEWHAT DIFFICULT
6. BECOMING EASILY ANNOYED OR IRRITABLE: MORE THAN HALF THE DAYS
2. NOT BEING ABLE TO STOP OR CONTROL WORRYING: 0
1. FEELING NERVOUS, ANXIOUS, OR ON EDGE: NOT AT ALL
3. WORRYING TOO MUCH ABOUT DIFFERENT THINGS: NOT AT ALL
1. FEELING NERVOUS, ANXIOUS, OR ON EDGE: 0
5. BEING SO RESTLESS THAT IT IS HARD TO SIT STILL: SEVERAL DAYS
5. BEING SO RESTLESS THAT IT IS HARD TO SIT STILL: 1
4. TROUBLE RELAXING: SEVERAL DAYS
4. TROUBLE RELAXING: 1
6. BECOMING EASILY ANNOYED OR IRRITABLE: 2
3. WORRYING TOO MUCH ABOUT DIFFERENT THINGS: 0
7. FEELING AFRAID AS IF SOMETHING AWFUL MIGHT HAPPEN: SEVERAL DAYS
2. NOT BEING ABLE TO STOP OR CONTROL WORRYING: NOT AT ALL

## 2024-05-17 ASSESSMENT — PATIENT HEALTH QUESTIONNAIRE - PHQ9
7. TROUBLE CONCENTRATING ON THINGS, SUCH AS READING THE NEWSPAPER OR WATCHING TELEVISION: NOT AT ALL
CLINICAL INTERPRETATION OF PHQ2 SCORE: NO FURTHER SCREENING NEEDED
1. LITTLE INTEREST OR PLEASURE IN DOING THINGS: SEVERAL DAYS
SUM OF ALL RESPONSES TO PHQ9 QUESTIONS 1 AND 2: 2
5. POOR APPETITE, WEIGHT LOSS, OR OVEREATING: SEVERAL DAYS
6. FEELING BAD ABOUT YOURSELF - OR THAT YOU ARE A FAILURE OR HAVE LET YOURSELF OR YOUR FAMILY DOWN: SEVERAL DAYS
CLINICAL INTERPRETATION OF PHQ9 SCORE: MILD DEPRESSION
9. THOUGHTS THAT YOU WOULD BE BETTER OFF DEAD, OR OF HURTING YOURSELF: NOT AT ALL
10. IF YOU CHECKED OFF ANY PROBLEMS, HOW DIFFICULT HAVE THESE PROBLEMS MADE IT FOR YOU TO DO YOUR WORK, TAKE CARE OF THINGS AT HOME, OR GET ALONG WITH OTHER PEOPLE: SOMEWHAT DIFFICULT
2. FEELING DOWN, DEPRESSED OR HOPELESS: SEVERAL DAYS
SUM OF ALL RESPONSES TO PHQ9 QUESTIONS 1 AND 2: 2
3. TROUBLE FALLING OR STAYING ASLEEP OR SLEEPING TOO MUCH: SEVERAL DAYS
8. MOVING OR SPEAKING SO SLOWLY THAT OTHER PEOPLE COULD HAVE NOTICED. OR THE OPPOSITE, BEING SO FIGETY OR RESTLESS THAT YOU HAVE BEEN MOVING AROUND A LOT MORE THAN USUAL: NOT AT ALL
4. FEELING TIRED OR HAVING LITTLE ENERGY: SEVERAL DAYS
SUM OF ALL RESPONSES TO PHQ QUESTIONS 1-9: 6

## 2024-05-23 ENCOUNTER — E-ADVICE (OUTPATIENT)
Dept: FAMILY MEDICINE | Age: 54
End: 2024-05-23

## 2024-05-28 RX ORDER — ESTRADIOL 0.1 MG/G
CREAM VAGINAL
Qty: 42.5 G | Refills: 1 | Status: SHIPPED | OUTPATIENT
Start: 2024-05-28 | End: 2024-06-20 | Stop reason: ALTCHOICE

## 2024-05-31 ENCOUNTER — TELEPHONE (OUTPATIENT)
Dept: FAMILY MEDICINE | Age: 54
End: 2024-05-31

## 2024-06-20 ENCOUNTER — TELEPHONE (OUTPATIENT)
Dept: FAMILY MEDICINE | Age: 54
End: 2024-06-20

## 2024-06-20 ENCOUNTER — APPOINTMENT (OUTPATIENT)
Dept: FAMILY MEDICINE | Age: 54
End: 2024-06-20

## 2024-06-20 VITALS
DIASTOLIC BLOOD PRESSURE: 78 MMHG | BODY MASS INDEX: 24.66 KG/M2 | SYSTOLIC BLOOD PRESSURE: 110 MMHG | HEIGHT: 65 IN | HEART RATE: 71 BPM | WEIGHT: 148 LBS | RESPIRATION RATE: 16 BRPM

## 2024-06-20 DIAGNOSIS — N95.1 VAGINAL DRYNESS, MENOPAUSAL: ICD-10-CM

## 2024-06-20 DIAGNOSIS — N94.10 DYSPAREUNIA, FEMALE: Primary | ICD-10-CM

## 2024-06-20 PROCEDURE — 99213 OFFICE O/P EST LOW 20 MIN: CPT | Performed by: NURSE PRACTITIONER

## 2024-06-20 RX ORDER — ESTRADIOL 0.1 MG/G
CREAM VAGINAL
Qty: 1 EACH | Refills: 12 | OUTPATIENT
Start: 2024-06-20

## 2024-06-24 ENCOUNTER — E-ADVICE (OUTPATIENT)
Dept: FAMILY MEDICINE | Age: 54
End: 2024-06-24

## 2024-06-25 RX ORDER — ESTRADIOL 0.1 MG/G
CREAM VAGINAL
Qty: 42.5 G | Refills: 12 | Status: SHIPPED | OUTPATIENT
Start: 2024-06-25

## 2024-08-23 ENCOUNTER — APPOINTMENT (OUTPATIENT)
Dept: AUDIOLOGY | Facility: CLINIC | Age: 54
End: 2024-08-23
Payer: COMMERCIAL

## 2024-10-06 ENCOUNTER — E-ADVICE (OUTPATIENT)
Dept: FAMILY MEDICINE | Age: 54
End: 2024-10-06

## 2024-10-08 ENCOUNTER — CLINICAL SUPPORT (OUTPATIENT)
Dept: AUDIOLOGY | Facility: CLINIC | Age: 54
End: 2024-10-08
Payer: COMMERCIAL

## 2024-10-08 DIAGNOSIS — H90.3 SENSORINEURAL HEARING LOSS (SNHL) OF BOTH EARS: ICD-10-CM

## 2024-10-08 PROCEDURE — 92567 TYMPANOMETRY: CPT | Performed by: AUDIOLOGIST

## 2024-10-08 PROCEDURE — 92557 COMPREHENSIVE HEARING TEST: CPT | Performed by: AUDIOLOGIST

## 2024-10-08 NOTE — PROGRESS NOTES
"AUDIOLOGY ADULT AUDIOMETRIC EVALUATION      Name:  Dacia Little  :  1970  Age:  54 y.o.  Date of Evaluation:  10/8/2024    HISTORY  Reason for visit:  hearing loss  Ms. Little is seen 10/8/2024 at the request of  Shantell Vazquez, APR* for an evaluation of hearing.      Chief complaint:    - Hearing loss for 10-15 years; progressive; notices difficulty in background noise  - constant, bilateral    Hearing loss:  yes  Tinnitus:   yes  Otitis Media: denies  Otologic surgical history:  denies  Dizziness/imbalance:  denies  Otalgia:  denies  Ear pressure/fullness:  occasional  History of excessive noise exposure:  yes  Other: has noticed a lot of wax; cleaned by primary care once per year    Hearing aid history: none         EVALUATION  Please find audiogram in \"Media\" tab (Document Type:  Audiology Report) or included at the bottom of this note.    RESULTS   Otoscopic Evaluation: clear canals bilaterally      Immittance Measures (226 Hz probe tone):   Tympanometry is consistent with normal middle ear pressure bilaterally, with normal right and hypermobile left tympanic membrane mobility.      Test technique:  standard behavioral technique via insert earphones.  Reliability is good.    Pure Tone Audiometry:    Right ear:  Hearing sensitivity is in the normal hearing to severe hearing loss range  Left ear: Hearing sensitivity is in the normal hearing to moderately-severe hearing loss range.       Speech Audiometry:        Right Ear:  Speech Reception Threshold (SRT) was obtained at 20 dBHL                 Speech discrimination score was 92% in quiet when words were presented at 70 dBHL      Left Ear:  Speech Reception Threshold (SRT) was obtained at 20 dBHL                 Speech discrimination score was 92% in quiet when words were presented at 70 dBHL    IMPRESSIONS:  Patient is expected to have communication difficulty in adverse listening environments.    Patient is expected to benefit from devices that " provide amplification (e.g., hearing aids) and improve the desired sound signal over that of background noise as well as from effective communication strategies.       RECOMMENDATIONS  Continue with medical follow-up with referring provider.    Hearing Aid Evaluation with an audiologist to discuss hearing technology (such as hearing aids) and services.  Reassess hearing in 1 year (or sooner if medically indicated or if there is a concern for a change in hearing).    Continue with medical follow-up as indicated.       PATIENT EDUCATION  Discussed results and recommendations with patient.  Questions were addressed and the patient was encouraged to contact our department should concerns arise.       ROBERTO CARLOS Ortega, CCC-A  Licensed Audiologist

## 2025-04-17 PROBLEM — K58.1 IRRITABLE BOWEL SYNDROME WITH CONSTIPATION: Status: ACTIVE | Noted: 2025-04-17

## 2025-04-21 ENCOUNTER — APPOINTMENT (OUTPATIENT)
Dept: PRIMARY CARE | Facility: CLINIC | Age: 55
End: 2025-04-21
Payer: COMMERCIAL

## 2025-04-22 LAB
GLUCOSE INTERSTITIAL FLD-MCNC: 96 MG/DL
LDLC SERPL CALC-MCNC: 77 MG/DL

## 2025-05-21 ENCOUNTER — APPOINTMENT (OUTPATIENT)
Dept: PRIMARY CARE | Facility: CLINIC | Age: 55
End: 2025-05-21
Payer: COMMERCIAL

## 2025-06-09 ENCOUNTER — APPOINTMENT (OUTPATIENT)
Dept: FAMILY MEDICINE | Age: 55
End: 2025-06-09

## 2025-06-09 VITALS
BODY MASS INDEX: 24.66 KG/M2 | HEART RATE: 76 BPM | RESPIRATION RATE: 16 BRPM | HEIGHT: 65 IN | DIASTOLIC BLOOD PRESSURE: 84 MMHG | WEIGHT: 148 LBS | SYSTOLIC BLOOD PRESSURE: 126 MMHG

## 2025-06-09 DIAGNOSIS — Z00.00 WELL WOMAN EXAM WITHOUT GYNECOLOGICAL EXAM: Primary | ICD-10-CM

## 2025-06-09 DIAGNOSIS — K21.9 GASTROESOPHAGEAL REFLUX DISEASE WITHOUT ESOPHAGITIS: ICD-10-CM

## 2025-06-09 DIAGNOSIS — N94.10 DYSPAREUNIA, FEMALE: ICD-10-CM

## 2025-06-09 DIAGNOSIS — N95.1 MENOPAUSAL SYNDROME (HOT FLASHES): ICD-10-CM

## 2025-06-09 DIAGNOSIS — N95.1 VAGINAL DRYNESS, MENOPAUSAL: ICD-10-CM

## 2025-06-09 PROBLEM — B00.1 RECURRENT COLD SORES: Status: RESOLVED | Noted: 2024-05-17 | Resolved: 2025-06-09

## 2025-06-09 PROBLEM — R47.89 WORD FINDING DIFFICULTY: Status: RESOLVED | Noted: 2024-05-17 | Resolved: 2025-06-09

## 2025-06-09 PROBLEM — Z86.19 HISTORY OF HERPES GENITALIS: Status: RESOLVED | Noted: 2024-05-17 | Resolved: 2025-06-09

## 2025-06-09 PROBLEM — R61 NIGHT SWEATS: Status: RESOLVED | Noted: 2024-05-17 | Resolved: 2025-06-09

## 2025-06-09 PROBLEM — R73.01 IFG (IMPAIRED FASTING GLUCOSE): Status: RESOLVED | Noted: 2024-05-17 | Resolved: 2025-06-09

## 2025-06-09 PROBLEM — R06.83 SNORING: Status: RESOLVED | Noted: 2024-05-17 | Resolved: 2025-06-09

## 2025-06-09 PROBLEM — N39.3 STRESS INCONTINENCE: Status: RESOLVED | Noted: 2024-05-17 | Resolved: 2025-06-09

## 2025-06-09 PROCEDURE — 99396 PREV VISIT EST AGE 40-64: CPT | Performed by: NURSE PRACTITIONER

## 2025-06-09 RX ORDER — GARLIC 180 MG
TABLET, DELAYED RELEASE (ENTERIC COATED) ORAL
COMMUNITY

## 2025-06-09 RX ORDER — SOY ISOFLAV/BCOHOSH/CISSUS QUA 198 MG
CAPSULE ORAL
COMMUNITY

## 2025-06-09 RX ORDER — VALACYCLOVIR HYDROCHLORIDE 500 MG/1
TABLET, FILM COATED ORAL
Qty: 90 TABLET | Refills: 3 | Status: SHIPPED | OUTPATIENT
Start: 2025-06-09

## 2025-06-09 RX ORDER — ESTRADIOL 0.1 MG/G
CREAM VAGINAL
Qty: 42.5 G | Refills: 12 | Status: SHIPPED | OUTPATIENT
Start: 2025-06-09

## 2025-06-09 SDOH — ECONOMIC STABILITY: FOOD INSECURITY: WITHIN THE PAST 12 MONTHS, THE FOOD YOU BOUGHT JUST DIDN'T LAST AND YOU DIDN'T HAVE MONEY TO GET MORE.: NEVER TRUE

## 2025-06-09 SDOH — ECONOMIC STABILITY: HOUSING INSECURITY: DO YOU HAVE PROBLEMS WITH ANY OF THE FOLLOWING?: MOLD

## 2025-06-09 SDOH — ECONOMIC STABILITY: TRANSPORTATION INSECURITY
IN THE PAST 12 MONTHS, HAS LACK OF RELIABLE TRANSPORTATION KEPT YOU FROM MEDICAL APPOINTMENTS, MEETINGS, WORK OR FROM GETTING THINGS NEEDED FOR DAILY LIVING?: NO

## 2025-06-09 SDOH — ECONOMIC STABILITY: HOUSING INSECURITY: WHAT IS YOUR LIVING SITUATION TODAY?: I HAVE A STEADY PLACE TO LIVE

## 2025-06-09 ASSESSMENT — PATIENT HEALTH QUESTIONNAIRE - PHQ9
CLINICAL INTERPRETATION OF PHQ2 SCORE: NO FURTHER SCREENING NEEDED
SUM OF ALL RESPONSES TO PHQ9 QUESTIONS 1 AND 2: 2
1. LITTLE INTEREST OR PLEASURE IN DOING THINGS: SEVERAL DAYS
SUM OF ALL RESPONSES TO PHQ9 QUESTIONS 1 AND 2: 2
2. FEELING DOWN, DEPRESSED OR HOPELESS: SEVERAL DAYS

## 2025-06-09 ASSESSMENT — SOCIAL DETERMINANTS OF HEALTH (SDOH): IN THE PAST 12 MONTHS, HAS THE ELECTRIC, GAS, OIL, OR WATER COMPANY THREATENED TO SHUT OFF SERVICE IN YOUR HOME?: NO

## 2025-06-15 ENCOUNTER — E-ADVICE (OUTPATIENT)
Dept: FAMILY MEDICINE | Age: 55
End: 2025-06-15

## 2025-07-02 ENCOUNTER — APPOINTMENT (OUTPATIENT)
Dept: PRIMARY CARE | Facility: CLINIC | Age: 55
End: 2025-07-02
Payer: COMMERCIAL

## 2025-07-02 VITALS
HEART RATE: 79 BPM | BODY MASS INDEX: 26.59 KG/M2 | HEIGHT: 67 IN | SYSTOLIC BLOOD PRESSURE: 145 MMHG | OXYGEN SATURATION: 95 % | DIASTOLIC BLOOD PRESSURE: 78 MMHG | TEMPERATURE: 97.5 F | WEIGHT: 169.4 LBS

## 2025-07-02 DIAGNOSIS — D22.9 SUSPICIOUS NEVUS: ICD-10-CM

## 2025-07-02 DIAGNOSIS — Z00.00 ROUTINE GENERAL MEDICAL EXAMINATION AT A HEALTH CARE FACILITY: Primary | ICD-10-CM

## 2025-07-02 DIAGNOSIS — Z82.49 FH: MI (MYOCARDIAL INFARCTION): ICD-10-CM

## 2025-07-02 DIAGNOSIS — R53.83 FATIGUE, UNSPECIFIED TYPE: ICD-10-CM

## 2025-07-02 PROCEDURE — 99396 PREV VISIT EST AGE 40-64: CPT | Performed by: NURSE PRACTITIONER

## 2025-07-02 PROCEDURE — 3008F BODY MASS INDEX DOCD: CPT | Performed by: NURSE PRACTITIONER

## 2025-07-02 ASSESSMENT — ENCOUNTER SYMPTOMS
TROUBLE SWALLOWING: 0
UNEXPECTED WEIGHT CHANGE: 0
FEVER: 0
EYE DISCHARGE: 0
APPETITE CHANGE: 0
COUGH: 0
VOMITING: 0
EYE REDNESS: 0
FREQUENCY: 0
DYSURIA: 0
POLYDIPSIA: 0
WOUND: 0
HEADACHES: 0
ADENOPATHY: 0
BRUISES/BLEEDS EASILY: 0
DIZZINESS: 0
SORE THROAT: 0
BLOOD IN STOOL: 0
ABDOMINAL PAIN: 0
HALLUCINATIONS: 0
FATIGUE: 0
POLYPHAGIA: 0
CONFUSION: 0
NECK PAIN: 0
BACK PAIN: 0
EYE PAIN: 0
PALPITATIONS: 0
HEMATURIA: 0
SHORTNESS OF BREATH: 0
CHILLS: 0

## 2025-07-02 ASSESSMENT — PATIENT HEALTH QUESTIONNAIRE - PHQ9
SUM OF ALL RESPONSES TO PHQ9 QUESTIONS 1 AND 2: 0
1. LITTLE INTEREST OR PLEASURE IN DOING THINGS: NOT AT ALL
2. FEELING DOWN, DEPRESSED OR HOPELESS: NOT AT ALL

## 2025-07-02 NOTE — PATIENT INSTRUCTIONS
Magnesium glycinate-for insomnia and hands 400mg at bed    Voltaren gel at bed for hips  Let me know if want muscle relaxant    Moshe nighttime is ok    Check bp at home daily  Goal <140/<90  I will contact you in 1wk to obtain bps      Handouts given to pt:  physical handout        Labs- No appt needed:    You can use the lab in our building when fasting. The hrs are: Mon-Fri 7a-330p.  No Saturday hrs. Bring the paper order.   OR   [facility] Mon-Fri 7a-12p. No Saturday hrs. Bring the paper order.  OR   [facility] Mon-Fri 630a-530p or Sat 6:30a-12p. Bring the paper order  OR  [facility] Mon-FrI 7a-5p  [facility] Mon-Fri 730a-4p, Sat  8a-12p  [facility] [address] Mon-Thurs 630a-6p , Fri 630a-4p, Sat 8a-12p  [facility] [address] Mon-Fri 7a-630p and Sat. 7a-3p    Fasting is no food, drink, gum or mints other than water for 12 hrs.   Results will be back in 2-3 business days for most labs. It is always recommended for any orders (labs, xrays, ultrasounds,MRI, ct scan, procedures etc) to check with your insurance provider for expected costs or expenses to you.         You will get your results via phone from my medical assistant if you do not have MyChart.  OR  You will get your results via [employer]    If a result is urgent, I will call to speak to you.    Vaccines:  Declines tdap, [id number] and shingrix    General recommendations:  Exercise-cardio 4-5d/wk 30min each day  Diet-Breakfast-toast (my favorite Sherry Kaufman Delighful Multigrain or Guillermo's Killer Bread Good Seed thin-sliced)/bagel/English muffin-whole wheat flour as a 1st ingredient or cereal/oatmeal/granola bar-fiber 4g or more or protein like eggs or peanut butter; optional veggies  Lunch-protein, 1/2c carb or 2 slices bread, veg 1c  Dinner-protein, fist sized carb, veg 1c  Fruit 2 a day  Dairy 2 a day-milk, soy milk, almond milk, cheese, yogurt, cottage cheese  Snacks-Protein-hard boiled egg,  nuts (walnuts/almonds/pecans/pistachios 1/4c), hummus, beef/deer jerky or meat sticks; vegetable, fruit, dairy-milk(1%, skim, almond, soy)/cheese (not a lot of cheddar)/yogurt (Greek is best-my favorite Dannon Fruit on the Bottom Greek)/cottage cheese 2%; triscuits/ popcorn/wheat thins have a lot of fiber; follow serving size on bag/box/container  increase water  Limit alcohol to 1 drink per day for women and 2 drinks per day for men (1 drink=12oz beer or 5oz wine or 1 1/2oz liquor)  Calcium: 500mg 1 twice a day if age 50 and younger and 600mg 1 twice a day if over age 50 (calcium citrate can be taken without food)  Vitamin D: 800-5000 IU/day  Limit salt to <2300mg a day if age 50 and under and <1500mg a day if over age 50/have high bp or diabetes or kidney disease  Recommend folate for childbearing age women 0.4mg per day (can be found in a multivitamin)  Recommend 18mg/dL of iron a day if age 50 and under and 8mg/dL a day if over age 50; take on an empty stomach at bedtime  Use sunscreen   Wear seatbelt  Recommend safe sex practices: using condoms everytime you have sex, discuss with a new partner about their past partners/history of STDs/drug use, avoid drinking alcohol or using drugs as this increases the chance that you will participate in high-risk sex, for oral sex help protect your mouth by having your partner use a condom (male or female), women should not douche after sex, be aware of your partner's body and your body-look for signs of a sore, blister, rash, or discharge, and have regular exams and periodic tests for STDs.  No distracted driving  No driving when under influence of substances  Wear a seatbelt  Eye dr every 1-2yrs  Dentist every 6-12 mon  Tetanus shot every 10yrs  Recommend flu vaccine in the fall  Appt in  1 year for physical      I will communicate with you via [employer] regarding messages and results. If you need help with this, you can call the support line at [phone].    IT WAS A  PLEASURE TO SEE YOU TODAY. THANK YOU FOR CHOOSING US FOR YOUR HEALTHCARE NEEDS.

## 2025-07-02 NOTE — PROGRESS NOTES
Subjective   Patient ID: Dacia Little is a 54 y.o. female who presents for Annual Exam.    Is pt fasting? yes  Does pt see any other providers than below? no  kinza Palm    Any forms to fill out?  no  Does pt want a tetanus shot?no  Does pt want shingles vaccine?no  Does pt want pneumonia shot?no  Does pt want to discuss quitting smoking?no  Any other questions or concerns that you want to discuss today?   Would like derm refer      No care team member to display    HPI  Last labs-4/2024 Sugar (aka glucose), kidney function, liver function and electrolytes in the CMP (comprehensive metabolic panel) were normal.  Hdl and trigs normal.  Ldl high at 135. Goal <100. Decr fats and incr fiber.  CBC (complete blood count) was normal which looks at infection and anemia markers.  Sed rate, a general inflammation marker, is normal.  Ck, a muscle breakdown lab, is normal.  [place] nl  RF nl  TSH (thyroid test) was normal.  1/2022 ldl 110, A1c 5, tsh nl, cmp nl  Due for labs- all    Cholesterol   Date Value Ref Range Status   01/25/2022 203 (H) 0 - 199 mg/dL Final     Comment:     .      AGE      DESIRABLE   BORDERLINE HIGH   HIGH     0-19 Y     0 - 169       170 - 199     >/= 200    20-24 Y     0 - 189       190 - 224     >/= 225         >24 Y     0 - 199       200 - 239     >/= 240   **All ranges are based on fasting samples. Specific   therapeutic targets will vary based on patient-specific   cardiac risk.  .   Pediatric guidelines reference:Pediatrics 2011, 128(S5).   Adult guidelines reference: NCEP ATPIII Guidelines,     REG 2001, 258:2486-97  .   Venipuncture immediately after or during the    administration of Metamizole may lead to falsely   low results. Testing should be performed immediately   prior to Metamizole dosing.       Triglycerides   Date Value Ref Range Status   01/25/2022 96 0 - 149 mg/dL Final     Comment:     .      AGE      DESIRABLE   BORDERLINE HIGH   HIGH     VERY HIGH   0 D-90 D    19 - 174      "    ----         ----        ----  91 D- 9 Y     0 -  74        75 -  99     >/= 100      ----    10-19 Y     0 -  89        90 - 129     >/= 130      ----    20-24 Y     0 - 114       115 - 149     >/= 150      ----         >24 Y     0 - 149       150 - 199    200- 499    >/= 500  .   Venipuncture immediately after or during the    administration of Metamizole may lead to falsely   low results. Testing should be performed immediately   prior to Metamizole dosing.       HDL   Date Value Ref Range Status   01/25/2022 73.6 mg/dL Final     Comment:     .      AGE      VERY LOW   LOW     NORMAL    HIGH       0-19 Y       < 35   < 40     40-45     ----    20-24 Y       ----   < 40       >45     ----      >24 Y       ----   < 40     40-60      >60  .       No results found for: \"LDL\"  TSH   Date Value Ref Range Status   01/25/2022 1.75 0.44 - 3.98 mIU/L Final     Comment:      TSH testing is performed using different testing    methodology at [facility] than at other    [facility] hospitals. Direct result comparisons should    only be made within the same method.       No results found for: \"A1C\"  No components found for: \"POCA1C\"  No results found for: \"ALBUR\"  No components found for: \"POCALBUR\"      Other concerns: would like derm referral-1 mole growing  Brown area under lf armpit x6mon+ and gotten bigger  Not itchy or painful    Jt pain from menopause-hip-hard to sleep; worse if walk a lot  Some insomnia  Hands fall asleep -entire hand and fingers  Given a hormone pill-prometrium but did not take it. 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    last eye dr appt- contacts, end of last yr  No vision issues    last dental appt- earlier this wk    BMs-regular-ibs-c  Sleep-able to fall asleep but hard to stay asleep-MN to 7p-not sleep the entire time; no snoring or apnea  no cp, swelling, sob, abd pain, n/v/d/c, blood in stool or black stools  STI testing including hiv (age 15-65) and hep c screening (18-79)-declines        Immunization History   Administered Date(s) Administered    COVID-19, mRNA, LNP-S, PF, 30 mcg/0.3 mL dose 03/21/2021, 04/13/2021       Tdap-declines  Shingles vaccine-declines  Lyclsyr64-hcythjby      fractures in lifetime-none  Anyone with osteoporosis in the family-none    FH heart attack, heart surgery-mat uncle, mat aunt, dad?-covid time so didn't do an autopsy, mgf-mi, pgf?  FH stroke-none    The ASCVD Risk score (Ricky DK, et al., 2019) failed to calculate for the following reasons:    Cannot find a previous HDL lab    Cannot find a previous total cholesterol lab  Coronary Artery Calcium score:  This test is recommended for men 45 or older and women 55 or older without a history of heart disease and have 1 risk factor (high LDL cholesterol, low HDL cholesterol, high blood pressure, smoker (current or past), type 2 diabetes, IBD, lupus, RA, ankylosing spondylitis, psoriasis or family history of  heart disease <55yrs in dad, brother or child or <65yrs in mom, sister, or child.)       Review of Systems   Constitutional:  Negative for appetite change, chills, fatigue, fever and unexpected weight change.   HENT:  Negative for congestion, ear pain, sore throat and trouble swallowing.    Eyes:  Negative for pain, discharge and redness.   Respiratory:  Negative for cough and shortness of  breath.    Cardiovascular:  Negative for chest pain and palpitations.   Gastrointestinal:  Negative for abdominal pain, blood in stool and vomiting.   Endocrine: Negative for polydipsia, polyphagia and polyuria.   Genitourinary:  Negative for dysuria, frequency, hematuria and urgency.   Musculoskeletal:  Negative for back pain and neck pain.   Skin:  Negative for rash and wound.   Allergic/Immunologic: Negative for immunocompromised state.   Neurological:  Negative for dizziness, syncope and headaches.   Hematological:  Negative for adenopathy. Does not bruise/bleed easily.   Psychiatric/Behavioral:  Negative for confusion and hallucinations.        Objective   Visit Vitals  /82   Pulse 79   Temp 36.4 °C (97.5 °F)        BP Readings from Last 3 Encounters:   07/02/25 152/82   04/19/24 117/71   01/25/22 116/72     Wt Readings from Last 3 Encounters:   07/02/25 76.8 kg (169 lb 6.4 oz)   04/19/24 78.5 kg (173 lb)   01/25/22 73.5 kg (162 lb)           Physical Exam  Constitutional:       General: She is not in acute distress.     Appearance: Normal appearance. She is not ill-appearing.   HENT:      Head: Normocephalic.      Right Ear: Tympanic membrane, ear canal and external ear normal.      Left Ear: Tympanic membrane, ear canal and external ear normal.      Nose: Nose normal.      Mouth/Throat:      Mouth: Mucous membranes are moist.      Pharynx: Oropharynx is clear.   Eyes:      Extraocular Movements: Extraocular movements intact.      Conjunctiva/sclera: Conjunctivae normal.      Pupils: Pupils are equal, round, and reactive to light.   Cardiovascular:      Rate and Rhythm: Normal rate and regular rhythm.      Heart sounds: Normal heart sounds. No murmur heard.  Pulmonary:      Effort: Pulmonary effort is normal. No respiratory distress.      Breath sounds: Normal breath sounds. No wheezing, rhonchi or rales.   Abdominal:      General: Bowel sounds are normal.      Palpations: Abdomen is soft. There is no  mass.      Tenderness: There is no abdominal tenderness.   Musculoskeletal:         General: No swelling or tenderness. Normal range of motion.      Cervical back: Normal range of motion and neck supple.      Right lower leg: No edema.      Left lower leg: No edema.   Skin:     General: Skin is warm.      Findings: No rash.   Neurological:      General: No focal deficit present.      Mental Status: She is alert and oriented to person, place, and time.      Cranial Nerves: No cranial nerve deficit.      Motor: No weakness.   Psychiatric:         Mood and Affect: Mood normal.         Behavior: Behavior normal.         Assessment/Plan   Diagnoses and all orders for this visit:  Routine general medical examination at a health care facility  -     Comprehensive Metabolic Panel; Future  -     CBC and Auto Differential; Future  -     Lipid Panel; Future  -     TSH with reflex to Free T4 if abnormal; Future  Suspicious nevus  -     Referral to Dermatology  Fatigue, unspecified type  -     Vitamin B12; Future  -     Vitamin D 25-Hydroxy,Total (for eval of Vitamin D levels); Future  FH: MI (myocardial infarction)  -     CT cardiac scoring wo IV contrast; Future  Other orders  -     Follow Up In Primary Care - Health Maintenance  -     Follow Up In Primary Care - Health Maintenance; Future

## 2025-07-03 LAB
NON-UH HIE A/G RATIO: 1.6
NON-UH HIE ALB: 4.7 G/DL (ref 3.4–5)
NON-UH HIE ALK PHOS: 55 UNIT/L (ref 45–117)
NON-UH HIE BASO COUNT: 0.03 X1000 (ref 0–0.2)
NON-UH HIE BASOS %: 0.6 %
NON-UH HIE BILIRUBIN, TOTAL: 0.4 MG/DL (ref 0.3–1.2)
NON-UH HIE BUN/CREAT RATIO: 6.7
NON-UH HIE BUN: 6 MG/DL (ref 9–23)
NON-UH HIE CALCIUM: 9.8 MG/DL (ref 8.7–10.4)
NON-UH HIE CALCULATED LDL CHOLESTEROL: 125 MG/DL (ref 60–130)
NON-UH HIE CALCULATED OSMOLALITY: 279 MOSM/KG (ref 275–295)
NON-UH HIE CHLORIDE: 107 MMOL/L (ref 98–107)
NON-UH HIE CHOLESTEROL: 212 MG/DL (ref 100–200)
NON-UH HIE CO2, VENOUS: 29 MMOL/L (ref 20–31)
NON-UH HIE CREATININE: 0.9 MG/DL (ref 0.5–0.8)
NON-UH HIE DIFF?: ABNORMAL
NON-UH HIE EOS COUNT: 0.08 X1000 (ref 0–0.5)
NON-UH HIE EOSIN %: 1.4 %
NON-UH HIE GFR AA: >60
NON-UH HIE GLOBULIN: 2.9 G/DL
NON-UH HIE GLOMERULAR FILTRATION RATE: >60 ML/MIN/1.73M?
NON-UH HIE GLUCOSE: 95 MG/DL (ref 74–106)
NON-UH HIE GOT: 16 UNIT/L (ref 15–37)
NON-UH HIE GPT: 8 UNIT/L (ref 10–49)
NON-UH HIE HCT: 39.9 % (ref 36–46)
NON-UH HIE HDL CHOLESTEROL: 67 MG/DL (ref 40–60)
NON-UH HIE HGB: 13.7 G/DL (ref 12–16)
NON-UH HIE INSTR WBC: 5.3
NON-UH HIE K: 4 MMOL/L (ref 3.5–5.1)
NON-UH HIE LYMPH %: 28 %
NON-UH HIE LYMPH COUNT: 1.49 X1000 (ref 1.2–4.8)
NON-UH HIE MCH: 32.6 PG (ref 27–34)
NON-UH HIE MCHC: 34.4 G/DL (ref 32–37)
NON-UH HIE MCV: 94.8 FL (ref 80–100)
NON-UH HIE MONO %: 5.4 %
NON-UH HIE MONO COUNT: 0.29 X1000 (ref 0.1–1)
NON-UH HIE MPV: 7.2 FL (ref 7.4–10.4)
NON-UH HIE NA: 141 MMOL/L (ref 135–145)
NON-UH HIE NEUTROPHIL %: 64.6 %
NON-UH HIE NEUTROPHIL COUNT (ANC): 3.44 X1000 (ref 1.4–8.8)
NON-UH HIE NUCLEATED RBC: 0 /100WBC
NON-UH HIE PLATELET: 238 X10 (ref 150–450)
NON-UH HIE RBC: 4.21 X10 (ref 4.2–5.4)
NON-UH HIE RDW: 13.5 % (ref 11.5–14.5)
NON-UH HIE TOTAL CHOL/HDL CHOL RATIO: 3.2
NON-UH HIE TOTAL PROTEIN: 7.6 G/DL (ref 5.7–8.2)
NON-UH HIE TRIGLYCERIDES: 98 MG/DL (ref 30–150)
NON-UH HIE TSH: 1.52 UIU/ML (ref 0.55–4.78)
NON-UH HIE VIT D 25: 15 NG/ML
NON-UH HIE VITAMIN B12: 305 PG/ML (ref 211–911)
NON-UH HIE WBC: 5.3 X10 (ref 4.5–11)

## 2025-07-04 ENCOUNTER — E-ADVICE (OUTPATIENT)
Dept: FAMILY MEDICINE | Age: 55
End: 2025-07-04

## 2025-07-04 DIAGNOSIS — N95.1 VAGINAL DRYNESS, MENOPAUSAL: ICD-10-CM

## 2025-07-04 DIAGNOSIS — N95.1 MENOPAUSAL SYNDROME (HOT FLASHES): Primary | ICD-10-CM

## 2025-07-07 DIAGNOSIS — E55.9 VITAMIN D DEFICIENCY: Primary | ICD-10-CM

## 2025-07-07 RX ORDER — ERGOCALCIFEROL 1.25 MG/1
1.25 CAPSULE ORAL
Qty: 5 CAPSULE | Refills: 2 | Status: SHIPPED | OUTPATIENT
Start: 2025-07-13 | End: 2025-10-20

## 2025-07-09 ENCOUNTER — LAB SERVICES (OUTPATIENT)
Dept: LAB | Age: 55
End: 2025-07-09

## 2025-07-09 DIAGNOSIS — N95.1 MENOPAUSAL SYNDROME (HOT FLASHES): ICD-10-CM

## 2025-07-09 DIAGNOSIS — N95.1 VAGINAL DRYNESS, MENOPAUSAL: ICD-10-CM

## 2025-07-09 LAB
FSH SERPL-ACNC: 121.8 MUNITS/ML
LH SERPL-ACNC: 52.1 MUNITS/ML
PROGEST SERPL-MCNC: 0.46 NG/ML
PROLACTIN SERPL-MCNC: 8.4 NG/ML (ref 2.8–29.2)
TSH SERPL-ACNC: 1.91 MCUNITS/ML (ref 0.35–5)

## 2025-07-09 PROCEDURE — 36415 COLL VENOUS BLD VENIPUNCTURE: CPT | Performed by: STUDENT IN AN ORGANIZED HEALTH CARE EDUCATION/TRAINING PROGRAM

## 2025-07-10 ENCOUNTER — HOSPITAL ENCOUNTER (OUTPATIENT)
Dept: RADIOLOGY | Facility: HOSPITAL | Age: 55
Discharge: HOME | End: 2025-07-10
Payer: COMMERCIAL

## 2025-07-10 ENCOUNTER — PATIENT MESSAGE (OUTPATIENT)
Dept: PRIMARY CARE | Facility: CLINIC | Age: 55
End: 2025-07-10
Payer: COMMERCIAL

## 2025-07-10 ENCOUNTER — TELEPHONE (OUTPATIENT)
Dept: FAMILY MEDICINE | Age: 55
End: 2025-07-10

## 2025-07-10 VITALS — SYSTOLIC BLOOD PRESSURE: 133 MMHG | DIASTOLIC BLOOD PRESSURE: 74 MMHG

## 2025-07-10 DIAGNOSIS — Z82.49 FH: MI (MYOCARDIAL INFARCTION): ICD-10-CM

## 2025-07-10 LAB — ESTRADIOL SERPL-MCNC: 27 PG/ML

## 2025-07-10 PROCEDURE — 75571 CT HRT W/O DYE W/CA TEST: CPT

## 2025-07-10 RX ORDER — PROGESTERONE 100 MG/1
100 CAPSULE ORAL DAILY
Qty: 90 CAPSULE | Refills: 3 | Status: SHIPPED | OUTPATIENT
Start: 2025-07-10

## 2025-07-10 RX ORDER — ESTRADIOL 0.5 MG/1
0.5 TABLET ORAL DAILY
Qty: 90 TABLET | Refills: 3 | Status: SHIPPED | OUTPATIENT
Start: 2025-07-10

## 2025-07-15 ENCOUNTER — RESULTS FOLLOW-UP (OUTPATIENT)
Dept: FAMILY MEDICINE | Age: 55
End: 2025-07-15

## 2025-07-16 LAB
DHEA SERPL-MCNC: 3.2 NG/ML (ref 0.63–4.7)
TESTOST SERPL-MCNC: 49 NG/DL (ref 9–55)

## 2025-08-04 ENCOUNTER — TELEPHONE (OUTPATIENT)
Dept: FAMILY MEDICINE | Age: 55
End: 2025-08-04

## 2025-08-08 ENCOUNTER — WALK IN (OUTPATIENT)
Dept: URGENT CARE | Age: 55
End: 2025-08-08

## 2025-08-08 VITALS
BODY MASS INDEX: 25.69 KG/M2 | SYSTOLIC BLOOD PRESSURE: 108 MMHG | RESPIRATION RATE: 20 BRPM | OXYGEN SATURATION: 99 % | HEART RATE: 63 BPM | WEIGHT: 152 LBS | DIASTOLIC BLOOD PRESSURE: 70 MMHG | TEMPERATURE: 98 F

## 2025-08-08 DIAGNOSIS — N30.00 ACUTE CYSTITIS WITHOUT HEMATURIA: ICD-10-CM

## 2025-08-08 DIAGNOSIS — R39.9 GU (GENITOURINARY) SYMPTOMS: Primary | ICD-10-CM

## 2025-08-08 LAB
APPEARANCE, POC: ABNORMAL
BILIRUB UR QL STRIP: NEGATIVE
COLOR UR: YELLOW
GLUCOSE UR-MCNC: NEGATIVE MG/DL
HGB UR QL STRIP: NEGATIVE
KETONES UR STRIP-MCNC: NEGATIVE MG/DL
LEUKOCYTE ESTERASE UR QL STRIP: ABNORMAL
NITRITE UR QL STRIP: NEGATIVE
PH UR: 7 [PH] (ref 5–7)
PROT UR-MCNC: NEGATIVE MG/DL
SP GR UR: 1.01 (ref 1–1.03)
UROBILINOGEN UR-MCNC: 0.2 MG/DL (ref 0–1)

## 2025-08-08 PROCEDURE — 81003 URINALYSIS AUTO W/O SCOPE: CPT | Performed by: REGISTERED NURSE

## 2025-08-08 PROCEDURE — 99214 OFFICE O/P EST MOD 30 MIN: CPT | Performed by: REGISTERED NURSE

## 2025-08-08 RX ORDER — NITROFURANTOIN 25; 75 MG/1; MG/1
100 CAPSULE ORAL 2 TIMES DAILY
Qty: 10 CAPSULE | Refills: 0 | Status: SHIPPED | OUTPATIENT
Start: 2025-08-08 | End: 2025-08-08 | Stop reason: ALTCHOICE

## 2025-08-08 RX ORDER — NITROFURANTOIN 25; 75 MG/1; MG/1
100 CAPSULE ORAL 2 TIMES DAILY
Qty: 10 CAPSULE | Refills: 0 | Status: SHIPPED | OUTPATIENT
Start: 2025-08-08

## 2025-08-08 ASSESSMENT — PAIN SCALES - GENERAL: PAINLEVEL_OUTOF10: 5

## 2025-08-10 LAB
BACTERIA UR CULT: ABNORMAL
BACTERIA UR CULT: ABNORMAL

## 2025-08-11 LAB — BACTERIA UR CULT: ABNORMAL

## 2025-10-23 ENCOUNTER — APPOINTMENT (OUTPATIENT)
Dept: FAMILY MEDICINE | Age: 55
End: 2025-10-23

## 2026-06-11 ENCOUNTER — APPOINTMENT (OUTPATIENT)
Dept: FAMILY MEDICINE | Age: 56
End: 2026-06-11

## 2026-07-06 ENCOUNTER — APPOINTMENT (OUTPATIENT)
Dept: PRIMARY CARE | Facility: CLINIC | Age: 56
End: 2026-07-06
Payer: COMMERCIAL

## (undated) DEVICE — MMIS - KIT UNIVERSAL GI OSHKOSH

## (undated) DEVICE — MMIS - CANNULA IV 15GA 1.5IN STD LL HUB BLUNT MNJCT STRL

## (undated) DEVICE — MMIS - KIT PROC DISP VLV CNCT ENDO CARRY-ON DEFENDO

## (undated) DEVICE — MMIS - STRAP PSTN 60X3IN DEVON BODY KN

## (undated) DEVICE — MMIS - BASIN EMESIS 500ML MOLD GRAD GRFT PLASTIC 16OZ LF

## (undated) DEVICE — MMIS - BLOCK BITE 60FR CAREGUARD DNTL RTNT RIM LAT PORT

## (undated) DEVICE — MMIS - TUBING SCT CLR 12FT .25IN MDVC NCDTV MALE TO MALE

## (undated) DEVICE — MMIS - SOLUTION IRR 250ML H2O PLASTIC POUR BTL N-PYRG

## (undated) DEVICE — MMIS - KIT PROC 1 TRNSPT PAD BDSD SCT TBG CMPLT BTN ENDO

## (undated) DEVICE — MMIS - MANIFOLD SCT NEPTUNE 1 PORT

## (undated) DEVICE — MMIS - UNDERPAD INCNT STD 30X30IN MAX ABS